# Patient Record
Sex: FEMALE | Race: WHITE | NOT HISPANIC OR LATINO | Employment: OTHER | URBAN - NONMETROPOLITAN AREA
[De-identification: names, ages, dates, MRNs, and addresses within clinical notes are randomized per-mention and may not be internally consistent; named-entity substitution may affect disease eponyms.]

---

## 2017-01-01 ENCOUNTER — HOSPITAL ENCOUNTER (INPATIENT)
Facility: HOSPITAL | Age: 67
LOS: 4 days | Discharge: HOME | End: 2017-07-06
Attending: INTERNAL MEDICINE | Admitting: FAMILY MEDICINE

## 2017-01-01 ENCOUNTER — APPOINTMENT (OUTPATIENT)
Dept: GENERAL RADIOLOGY | Facility: HOSPITAL | Age: 67
End: 2017-01-01

## 2017-01-01 VITALS
BODY MASS INDEX: 14.96 KG/M2 | HEIGHT: 62 IN | DIASTOLIC BLOOD PRESSURE: 48 MMHG | RESPIRATION RATE: 18 BRPM | TEMPERATURE: 97.1 F | OXYGEN SATURATION: 99 % | HEART RATE: 46 BPM | WEIGHT: 81.3 LBS | SYSTOLIC BLOOD PRESSURE: 133 MMHG

## 2017-01-01 LAB
ALBUMIN SERPL-MCNC: 3.9 G/DL (ref 3.5–5)
ALBUMIN/GLOB SERPL: 1.5 G/DL (ref 1.1–2.5)
ALP SERPL-CCNC: 81 U/L (ref 24–120)
ALT SERPL W P-5'-P-CCNC: 82 U/L (ref 0–54)
ANION GAP SERPL CALCULATED.3IONS-SCNC: 10 MMOL/L (ref 4–13)
ANION GAP SERPL CALCULATED.3IONS-SCNC: 4 MMOL/L (ref 4–13)
ANION GAP SERPL CALCULATED.3IONS-SCNC: 5 MMOL/L (ref 4–13)
APTT PPP: 25.2 SECONDS (ref 24.1–34.8)
ARTERIAL PATENCY WRIST A: ABNORMAL
AST SERPL-CCNC: 98 U/L (ref 7–45)
ATMOSPHERIC PRESS: ABNORMAL MMHG
BACTERIA UR QL AUTO: ABNORMAL /HPF
BASE EXCESS BLDA CALC-SCNC: -3.5 MMOL/L (ref -2–2)
BASE EXCESS BLDA CALC-SCNC: -5.1 MMOL/L (ref -2–2)
BASE EXCESS BLDA CALC-SCNC: 0.1 MMOL/L (ref -2–2)
BASE EXCESS BLDA CALC-SCNC: 4 MMOL/L (ref -2–2)
BASE EXCESS BLDA CALC-SCNC: 5.8 MMOL/L (ref -2–2)
BASOPHILS # BLD AUTO: 0.01 10*3/MM3 (ref 0–0.2)
BASOPHILS NFR BLD AUTO: 0.1 % (ref 0–2)
BDY SITE: ABNORMAL
BILIRUB SERPL-MCNC: 0.6 MG/DL (ref 0.1–1)
BILIRUB UR QL STRIP: NEGATIVE
BUN BLD-MCNC: 12 MG/DL (ref 5–21)
BUN BLD-MCNC: 15 MG/DL (ref 5–21)
BUN BLD-MCNC: 19 MG/DL (ref 5–21)
BUN/CREAT SERPL: 19 (ref 7–25)
BUN/CREAT SERPL: 27.3 (ref 7–25)
BUN/CREAT SERPL: 39.6 (ref 7–25)
CALCIUM SPEC-SCNC: 9.1 MG/DL (ref 8.4–10.4)
CALCIUM SPEC-SCNC: 9.2 MG/DL (ref 8.4–10.4)
CALCIUM SPEC-SCNC: 9.5 MG/DL (ref 8.4–10.4)
CHLORIDE SERPL-SCNC: 100 MMOL/L (ref 98–110)
CHLORIDE SERPL-SCNC: 102 MMOL/L (ref 98–110)
CHLORIDE SERPL-SCNC: 103 MMOL/L (ref 98–110)
CLARITY UR: CLEAR
CO2 SERPL-SCNC: 28 MMOL/L (ref 24–31)
CO2 SERPL-SCNC: 32 MMOL/L (ref 24–31)
CO2 SERPL-SCNC: 34 MMOL/L (ref 24–31)
COHGB MFR BLD: 0.9 % (ref 0–5.1)
COLOR UR: YELLOW
CREAT BLD-MCNC: 0.48 MG/DL (ref 0.5–1.4)
CREAT BLD-MCNC: 0.55 MG/DL (ref 0.5–1.4)
CREAT BLD-MCNC: 0.63 MG/DL (ref 0.5–1.4)
DEPRECATED RDW RBC AUTO: 42.4 FL (ref 40–54)
DEPRECATED RDW RBC AUTO: 42.5 FL (ref 40–54)
DEPRECATED RDW RBC AUTO: 43.4 FL (ref 40–54)
EOSINOPHIL # BLD AUTO: 0.05 10*3/MM3 (ref 0–0.7)
EOSINOPHIL NFR BLD AUTO: 0.5 % (ref 0–4)
EPAP: 6
ERYTHROCYTE [DISTWIDTH] IN BLOOD BY AUTOMATED COUNT: 13.1 % (ref 12–15)
ERYTHROCYTE [DISTWIDTH] IN BLOOD BY AUTOMATED COUNT: 13.2 % (ref 12–15)
ERYTHROCYTE [DISTWIDTH] IN BLOOD BY AUTOMATED COUNT: 13.2 % (ref 12–15)
GAS FLOW AIRWAY: 2 LPM
GFR SERPL CREATININE-BSD FRML MDRD: 111 ML/MIN/1.73
GFR SERPL CREATININE-BSD FRML MDRD: 130 ML/MIN/1.73
GFR SERPL CREATININE-BSD FRML MDRD: 95 ML/MIN/1.73
GLOBULIN UR ELPH-MCNC: 2.6 GM/DL
GLUCOSE BLD-MCNC: 125 MG/DL (ref 70–100)
GLUCOSE BLD-MCNC: 128 MG/DL (ref 70–100)
GLUCOSE BLD-MCNC: 150 MG/DL (ref 70–100)
GLUCOSE UR STRIP-MCNC: NEGATIVE MG/DL
HCO3 BLDA-SCNC: 23 MMOL/L (ref 22–26)
HCO3 BLDA-SCNC: 25.9 MMOL/L (ref 22–26)
HCO3 BLDA-SCNC: 26.8 MMOL/L (ref 22–26)
HCO3 BLDA-SCNC: 31.9 MMOL/L (ref 22–26)
HCO3 BLDA-SCNC: 32.8 MMOL/L (ref 22–26)
HCT VFR BLD AUTO: 33.6 % (ref 37–47)
HCT VFR BLD AUTO: 37.4 % (ref 37–47)
HCT VFR BLD AUTO: 45.4 % (ref 37–47)
HCT VFR BLD CALC: 36 % (ref 38–51)
HGB BLD-MCNC: 10.6 G/DL (ref 12–16)
HGB BLD-MCNC: 11.7 G/DL (ref 12–16)
HGB BLD-MCNC: 13.3 G/DL (ref 12–16)
HGB BLDA-MCNC: 12.1 G/DL (ref 12–16)
HGB UR QL STRIP.AUTO: ABNORMAL
HOROWITZ INDEX BLD+IHG-RTO: 100 %
HOROWITZ INDEX BLD+IHG-RTO: 30 %
HYALINE CASTS UR QL AUTO: ABNORMAL /LPF
IMM GRANULOCYTES # BLD: 0.02 10*3/MM3 (ref 0–0.03)
IMM GRANULOCYTES NFR BLD: 0.2 % (ref 0–5)
INR PPP: 1.07 (ref 0.91–1.09)
IPAP: 12
KETONES UR QL STRIP: ABNORMAL
LEUKOCYTE ESTERASE UR QL STRIP.AUTO: NEGATIVE
LYMPHOCYTES # BLD AUTO: 0.34 10*3/MM3 (ref 0.72–4.86)
LYMPHOCYTES NFR BLD AUTO: 3.1 % (ref 15–45)
Lab: ABNORMAL
Lab: ABNORMAL
MCH RBC QN AUTO: 26.3 PG (ref 28–32)
MCH RBC QN AUTO: 27.7 PG (ref 28–32)
MCH RBC QN AUTO: 28 PG (ref 28–32)
MCHC RBC AUTO-ENTMCNC: 29.3 G/DL (ref 33–36)
MCHC RBC AUTO-ENTMCNC: 31.3 G/DL (ref 33–36)
MCHC RBC AUTO-ENTMCNC: 31.5 G/DL (ref 33–36)
MCV RBC AUTO: 88.6 FL (ref 82–98)
MCV RBC AUTO: 88.7 FL (ref 82–98)
MCV RBC AUTO: 89.7 FL (ref 82–98)
METHGB BLD QL: 0.3 % (ref 0.4–1.5)
MODALITY: ABNORMAL
MONOCYTES # BLD AUTO: 0.12 10*3/MM3 (ref 0.19–1.3)
MONOCYTES NFR BLD AUTO: 1.1 % (ref 4–12)
NEUTROPHILS # BLD AUTO: 10.57 10*3/MM3 (ref 1.87–8.4)
NEUTROPHILS NFR BLD AUTO: 95 % (ref 39–78)
NITRITE UR QL STRIP: NEGATIVE
NOTIFIED BY: ABNORMAL
NOTIFIED BY: ABNORMAL
NOTIFIED WHO: ABNORMAL
NOTIFIED WHO: ABNORMAL
NRBC BLD MANUAL-RTO: 0 /100 WBC (ref 0–0)
NT-PROBNP SERPL-MCNC: 256 PG/ML (ref 0–900)
OXYHGB MFR BLDV: 91.9 % (ref 94–97)
PCO2 BLDA: 46.1 MM HG (ref 35–45)
PCO2 BLDA: 53.4 MM HG (ref 35–45)
PCO2 BLDA: 54.6 MM HG (ref 35–45)
PCO2 BLDA: 68.3 MM HG (ref 35–45)
PCO2 BLDA: 73.5 MM HG (ref 35–45)
PEEP RESPIRATORY: 5 CM[H2O]
PH BLDA: 7.18 PH UNITS (ref 7.35–7.45)
PH BLDA: 7.24 PH UNITS (ref 7.35–7.45)
PH BLDA: 7.3 PH UNITS (ref 7.35–7.45)
PH BLDA: 7.37 PH UNITS (ref 7.35–7.45)
PH BLDA: 7.39 PH UNITS (ref 7.35–7.45)
PH UR STRIP.AUTO: 6.5 [PH] (ref 5–8)
PLATELET # BLD AUTO: 178 10*3/MM3 (ref 130–400)
PLATELET # BLD AUTO: 196 10*3/MM3 (ref 130–400)
PLATELET # BLD AUTO: 206 10*3/MM3 (ref 130–400)
PMV BLD AUTO: 10.1 FL (ref 6–12)
PMV BLD AUTO: 10.2 FL (ref 6–12)
PMV BLD AUTO: 9.9 FL (ref 6–12)
PO2 BLDA: 571.9 MM HG (ref 80–100)
PO2 BLDA: 65 MM HG (ref 80–100)
PO2 BLDA: 84.4 MM HG (ref 80–100)
PO2 BLDA: 88.8 MM HG (ref 80–100)
PO2 BLDA: 89.4 MM HG (ref 80–100)
POTASSIUM BLD-SCNC: 4.5 MMOL/L (ref 3.5–5.3)
POTASSIUM BLD-SCNC: 4.8 MMOL/L (ref 3.5–5.3)
POTASSIUM BLD-SCNC: 4.9 MMOL/L (ref 3.5–5.3)
POTASSIUM BLDA-SCNC: 4.32 MMOL/L (ref 3.5–5)
PROT SERPL-MCNC: 6.5 G/DL (ref 6.3–8.7)
PROT UR QL STRIP: ABNORMAL
PROTHROMBIN TIME: 14.2 SECONDS (ref 11.9–14.6)
RBC # BLD AUTO: 3.79 10*6/MM3 (ref 4.2–5.4)
RBC # BLD AUTO: 4.22 10*6/MM3 (ref 4.2–5.4)
RBC # BLD AUTO: 5.06 10*6/MM3 (ref 4.2–5.4)
RBC # UR: ABNORMAL /HPF
REF LAB TEST METHOD: ABNORMAL
SAO2 % BLDCOA: 94.5 % (ref 94–100)
SAO2 % BLDCOA: 94.5 % (ref 94–100)
SAO2 % BLDCOA: 95.7 % (ref 94–100)
SAO2 % BLDCOA: 95.7 % (ref 94–100)
SAO2 % BLDCOA: 96.5 % (ref 94–100)
SAO2 % BLDCOA: 96.5 % (ref 94–100)
SAO2 % BLDCOA: 99.9 % (ref 94–100)
SAO2 % BLDCOA: 99.9 % (ref 94–100)
SODIUM BLD-SCNC: 138 MMOL/L (ref 135–145)
SODIUM BLD-SCNC: 139 MMOL/L (ref 135–145)
SODIUM BLD-SCNC: 141 MMOL/L (ref 135–145)
SODIUM BLDA-SCNC: 135.8 MMOL/L (ref 135–145)
SP GR UR STRIP: 1.01 (ref 1–1.03)
SQUAMOUS #/AREA URNS HPF: ABNORMAL /HPF
TOTAL RATE: 14 BREATHS/MINUTE
TOTAL RATE: 18 BREATHS/MINUTE
TOTAL RATE: 20 BREATHS/MINUTE
TROPONIN I SERPL-MCNC: 0.04 NG/ML (ref 0–0.03)
TROPONIN I SERPL-MCNC: 0.14 NG/ML (ref 0–0.03)
UROBILINOGEN UR QL STRIP: ABNORMAL
VENT CPAP/PEEP: 5
VENTILATOR MODE: AC
WBC NRBC COR # BLD: 11.11 10*3/MM3 (ref 4.8–10.8)
WBC NRBC COR # BLD: 3.41 10*3/MM3 (ref 4.8–10.8)
WBC NRBC COR # BLD: 4.76 10*3/MM3 (ref 4.8–10.8)
WBC UR QL AUTO: ABNORMAL /HPF

## 2017-01-01 PROCEDURE — 25010000002 METHYLPREDNISOLONE PER 40 MG: Performed by: NURSE PRACTITIONER

## 2017-01-01 PROCEDURE — 63710000001 PREDNISONE PER 1 MG: Performed by: NURSE PRACTITIONER

## 2017-01-01 PROCEDURE — 80048 BASIC METABOLIC PNL TOTAL CA: CPT | Performed by: INTERNAL MEDICINE

## 2017-01-01 PROCEDURE — 94799 UNLISTED PULMONARY SVC/PX: CPT

## 2017-01-01 PROCEDURE — 94660 CPAP INITIATION&MGMT: CPT

## 2017-01-01 PROCEDURE — 25010000002 ENOXAPARIN PER 10 MG: Performed by: INTERNAL MEDICINE

## 2017-01-01 PROCEDURE — 84484 ASSAY OF TROPONIN QUANT: CPT | Performed by: INTERNAL MEDICINE

## 2017-01-01 PROCEDURE — 82803 BLOOD GASES ANY COMBINATION: CPT

## 2017-01-01 PROCEDURE — 94640 AIRWAY INHALATION TREATMENT: CPT

## 2017-01-01 PROCEDURE — 25010000002 METHYLPREDNISOLONE PER 125 MG: Performed by: INTERNAL MEDICINE

## 2017-01-01 PROCEDURE — 94760 N-INVAS EAR/PLS OXIMETRY 1: CPT

## 2017-01-01 PROCEDURE — 82375 ASSAY CARBOXYHB QUANT: CPT

## 2017-01-01 PROCEDURE — 25010000002 LEVOFLOXACIN PER 250 MG: Performed by: INTERNAL MEDICINE

## 2017-01-01 PROCEDURE — 94002 VENT MGMT INPAT INIT DAY: CPT

## 2017-01-01 PROCEDURE — 82805 BLOOD GASES W/O2 SATURATION: CPT

## 2017-01-01 PROCEDURE — 36600 WITHDRAWAL OF ARTERIAL BLOOD: CPT

## 2017-01-01 PROCEDURE — 25010000003 HYDROXYZINE PER 25 MG: Performed by: INTERNAL MEDICINE

## 2017-01-01 PROCEDURE — 85027 COMPLETE CBC AUTOMATED: CPT | Performed by: INTERNAL MEDICINE

## 2017-01-01 PROCEDURE — 85610 PROTHROMBIN TIME: CPT | Performed by: INTERNAL MEDICINE

## 2017-01-01 PROCEDURE — 85730 THROMBOPLASTIN TIME PARTIAL: CPT | Performed by: INTERNAL MEDICINE

## 2017-01-01 PROCEDURE — 94770: CPT

## 2017-01-01 PROCEDURE — 5A09457 ASSISTANCE WITH RESPIRATORY VENTILATION, 24-96 CONSECUTIVE HOURS, CONTINUOUS POSITIVE AIRWAY PRESSURE: ICD-10-PCS | Performed by: INTERNAL MEDICINE

## 2017-01-01 PROCEDURE — 94003 VENT MGMT INPAT SUBQ DAY: CPT

## 2017-01-01 PROCEDURE — 25010000002 METHYLPREDNISOLONE PER 40 MG: Performed by: INTERNAL MEDICINE

## 2017-01-01 PROCEDURE — 71010 HC CHEST PA OR AP: CPT

## 2017-01-01 PROCEDURE — 81001 URINALYSIS AUTO W/SCOPE: CPT | Performed by: INTERNAL MEDICINE

## 2017-01-01 PROCEDURE — 83050 HGB METHEMOGLOBIN QUAN: CPT

## 2017-01-01 PROCEDURE — 85025 COMPLETE CBC W/AUTO DIFF WBC: CPT | Performed by: INTERNAL MEDICINE

## 2017-01-01 PROCEDURE — 80053 COMPREHEN METABOLIC PANEL: CPT | Performed by: INTERNAL MEDICINE

## 2017-01-01 PROCEDURE — 83880 ASSAY OF NATRIURETIC PEPTIDE: CPT | Performed by: INTERNAL MEDICINE

## 2017-01-01 PROCEDURE — 0BH17EZ INSERTION OF ENDOTRACHEAL AIRWAY INTO TRACHEA, VIA NATURAL OR ARTIFICIAL OPENING: ICD-10-PCS | Performed by: INTERNAL MEDICINE

## 2017-01-01 PROCEDURE — 5A1945Z RESPIRATORY VENTILATION, 24-96 CONSECUTIVE HOURS: ICD-10-PCS | Performed by: INTERNAL MEDICINE

## 2017-01-01 RX ORDER — DOXYCYCLINE HYCLATE 100 MG
100 TABLET ORAL EVERY 12 HOURS SCHEDULED
Status: DISCONTINUED | OUTPATIENT
Start: 2017-01-01 | End: 2017-01-01 | Stop reason: HOSPADM

## 2017-01-01 RX ORDER — METHYLPREDNISOLONE SODIUM SUCCINATE 40 MG/ML
40 INJECTION, POWDER, LYOPHILIZED, FOR SOLUTION INTRAMUSCULAR; INTRAVENOUS EVERY 8 HOURS
Status: DISCONTINUED | OUTPATIENT
Start: 2017-01-01 | End: 2017-01-01

## 2017-01-01 RX ORDER — BUDESONIDE AND FORMOTEROL FUMARATE DIHYDRATE 160; 4.5 UG/1; UG/1
2 AEROSOL RESPIRATORY (INHALATION)
Status: DISCONTINUED | OUTPATIENT
Start: 2017-01-01 | End: 2017-01-01 | Stop reason: HOSPADM

## 2017-01-01 RX ORDER — HYDROXYZINE PAMOATE 25 MG/1
25 CAPSULE ORAL EVERY 6 HOURS PRN
Status: DISCONTINUED | OUTPATIENT
Start: 2017-01-01 | End: 2017-01-01

## 2017-01-01 RX ORDER — MINERAL OIL AND WHITE PETROLATUM 150; 830 MG/G; MG/G
OINTMENT OPHTHALMIC EVERY 4 HOURS PRN
Status: DISCONTINUED | OUTPATIENT
Start: 2017-01-01 | End: 2017-01-01 | Stop reason: HOSPADM

## 2017-01-01 RX ORDER — DOXYCYCLINE 50 MG/1
100 CAPSULE ORAL EVERY 12 HOURS SCHEDULED
Status: DISCONTINUED | OUTPATIENT
Start: 2017-01-01 | End: 2017-01-01 | Stop reason: SDUPTHER

## 2017-01-01 RX ORDER — ATORVASTATIN CALCIUM 10 MG/1
10 TABLET, FILM COATED ORAL DAILY
COMMUNITY

## 2017-01-01 RX ORDER — LEVOTHYROXINE SODIUM 0.15 MG/1
150 TABLET ORAL DAILY
COMMUNITY

## 2017-01-01 RX ORDER — HYDROXYZINE PAMOATE 50 MG/1
50 CAPSULE ORAL EVERY 6 HOURS PRN
Status: DISCONTINUED | OUTPATIENT
Start: 2017-01-01 | End: 2017-01-01 | Stop reason: HOSPADM

## 2017-01-01 RX ORDER — FAMOTIDINE 10 MG/ML
20 INJECTION, SOLUTION INTRAVENOUS 2 TIMES DAILY
Status: DISCONTINUED | OUTPATIENT
Start: 2017-01-01 | End: 2017-01-01 | Stop reason: CLARIF

## 2017-01-01 RX ORDER — PANTOPRAZOLE SODIUM 40 MG/10ML
40 INJECTION, POWDER, LYOPHILIZED, FOR SOLUTION INTRAVENOUS
Status: DISCONTINUED | OUTPATIENT
Start: 2017-01-01 | End: 2017-01-01 | Stop reason: CLARIF

## 2017-01-01 RX ORDER — DIAZEPAM 5 MG/1
5 TABLET ORAL EVERY 8 HOURS SCHEDULED
Status: DISCONTINUED | OUTPATIENT
Start: 2017-01-01 | End: 2017-01-01 | Stop reason: HOSPADM

## 2017-01-01 RX ORDER — GUAIFENESIN 600 MG/1
1200 TABLET, EXTENDED RELEASE ORAL EVERY 12 HOURS SCHEDULED
Status: DISCONTINUED | OUTPATIENT
Start: 2017-01-01 | End: 2017-01-01 | Stop reason: HOSPADM

## 2017-01-01 RX ORDER — RIZATRIPTAN BENZOATE 10 MG/1
10 TABLET ORAL DAILY PRN
COMMUNITY

## 2017-01-01 RX ORDER — DIAZEPAM 10 MG/1
10 TABLET ORAL
COMMUNITY

## 2017-01-01 RX ORDER — ACETAMINOPHEN 325 MG/1
325 TABLET ORAL EVERY 4 HOURS PRN
Status: DISCONTINUED | OUTPATIENT
Start: 2017-01-01 | End: 2017-01-01 | Stop reason: HOSPADM

## 2017-01-01 RX ORDER — IPRATROPIUM BROMIDE AND ALBUTEROL SULFATE 2.5; .5 MG/3ML; MG/3ML
3 SOLUTION RESPIRATORY (INHALATION)
COMMUNITY

## 2017-01-01 RX ORDER — IPRATROPIUM BROMIDE AND ALBUTEROL SULFATE 2.5; .5 MG/3ML; MG/3ML
3 SOLUTION RESPIRATORY (INHALATION)
Status: DISCONTINUED | OUTPATIENT
Start: 2017-01-01 | End: 2017-01-01

## 2017-01-01 RX ORDER — METHYLPREDNISOLONE SODIUM SUCCINATE 125 MG/2ML
80 INJECTION, POWDER, LYOPHILIZED, FOR SOLUTION INTRAMUSCULAR; INTRAVENOUS EVERY 8 HOURS
Status: DISCONTINUED | OUTPATIENT
Start: 2017-01-01 | End: 2017-01-01

## 2017-01-01 RX ORDER — LEVOTHYROXINE SODIUM ANHYDROUS 100 UG/5ML
75 INJECTION, POWDER, LYOPHILIZED, FOR SOLUTION INTRAVENOUS
Status: DISCONTINUED | OUTPATIENT
Start: 2017-01-01 | End: 2017-01-01

## 2017-01-01 RX ORDER — ONDANSETRON 2 MG/ML
4 INJECTION INTRAMUSCULAR; INTRAVENOUS EVERY 6 HOURS PRN
Status: DISCONTINUED | OUTPATIENT
Start: 2017-01-01 | End: 2017-01-01 | Stop reason: HOSPADM

## 2017-01-01 RX ORDER — ACETAMINOPHEN 325 MG/1
325 TABLET ORAL EVERY 4 HOURS PRN
COMMUNITY

## 2017-01-01 RX ORDER — NICOTINE 21 MG/24HR
1 PATCH, TRANSDERMAL 24 HOURS TRANSDERMAL EVERY 24 HOURS
Status: DISCONTINUED | OUTPATIENT
Start: 2017-01-01 | End: 2017-01-01 | Stop reason: HOSPADM

## 2017-01-01 RX ORDER — DIAZEPAM 10 MG/1
10 TABLET ORAL EVERY 8 HOURS SCHEDULED
Status: DISCONTINUED | OUTPATIENT
Start: 2017-01-01 | End: 2017-01-01

## 2017-01-01 RX ORDER — BUDESONIDE AND FORMOTEROL FUMARATE DIHYDRATE 160; 4.5 UG/1; UG/1
2 AEROSOL RESPIRATORY (INHALATION)
Qty: 1 INHALER | Refills: 12 | Status: SHIPPED | OUTPATIENT
Start: 2017-01-01

## 2017-01-01 RX ORDER — DOXYCYCLINE HYCLATE 100 MG
100 TABLET ORAL EVERY 12 HOURS SCHEDULED
Qty: 4 TABLET | Refills: 0 | Status: SHIPPED | OUTPATIENT
Start: 2017-01-01

## 2017-01-01 RX ORDER — SOTALOL HYDROCHLORIDE 80 MG/1
80 TABLET ORAL EVERY 12 HOURS
COMMUNITY

## 2017-01-01 RX ORDER — FAMOTIDINE 20 MG/1
20 TABLET, FILM COATED ORAL 2 TIMES DAILY
Status: DISCONTINUED | OUTPATIENT
Start: 2017-01-01 | End: 2017-01-01 | Stop reason: HOSPADM

## 2017-01-01 RX ORDER — RIZATRIPTAN BENZOATE 5 MG/1
5 TABLET, ORALLY DISINTEGRATING ORAL ONCE AS NEEDED
Status: ON HOLD | COMMUNITY
End: 2017-01-01

## 2017-01-01 RX ORDER — IPRATROPIUM BROMIDE AND ALBUTEROL SULFATE 2.5; .5 MG/3ML; MG/3ML
3 SOLUTION RESPIRATORY (INHALATION) EVERY 4 HOURS PRN
Status: DISCONTINUED | OUTPATIENT
Start: 2017-01-01 | End: 2017-01-01

## 2017-01-01 RX ORDER — ONDANSETRON 4 MG/1
4 TABLET, FILM COATED ORAL EVERY 8 HOURS PRN
COMMUNITY

## 2017-01-01 RX ORDER — LEVOTHYROXINE SODIUM 0.15 MG/1
150 TABLET ORAL
Status: DISCONTINUED | OUTPATIENT
Start: 2017-01-01 | End: 2017-01-01 | Stop reason: HOSPADM

## 2017-01-01 RX ORDER — SOTALOL HYDROCHLORIDE 80 MG/1
80 TABLET ORAL 2 TIMES DAILY
Status: DISCONTINUED | OUTPATIENT
Start: 2017-01-01 | End: 2017-01-01 | Stop reason: HOSPADM

## 2017-01-01 RX ORDER — LEVOFLOXACIN 5 MG/ML
500 INJECTION, SOLUTION INTRAVENOUS EVERY 24 HOURS
Status: DISCONTINUED | OUTPATIENT
Start: 2017-01-01 | End: 2017-01-01

## 2017-01-01 RX ORDER — PREDNISONE 20 MG/1
20 TABLET ORAL
Qty: 7 TABLET | Refills: 0 | Status: SHIPPED | OUTPATIENT
Start: 2017-01-01

## 2017-01-01 RX ORDER — SODIUM CHLORIDE 0.9 % (FLUSH) 0.9 %
1-10 SYRINGE (ML) INJECTION AS NEEDED
Status: DISCONTINUED | OUTPATIENT
Start: 2017-01-01 | End: 2017-01-01 | Stop reason: HOSPADM

## 2017-01-01 RX ORDER — IPRATROPIUM BROMIDE AND ALBUTEROL SULFATE 2.5; .5 MG/3ML; MG/3ML
3 SOLUTION RESPIRATORY (INHALATION)
Status: DISCONTINUED | OUTPATIENT
Start: 2017-01-01 | End: 2017-01-01 | Stop reason: HOSPADM

## 2017-01-01 RX ORDER — HYDROCODONE BITARTRATE AND ACETAMINOPHEN 10; 325 MG/1; MG/1
1 TABLET ORAL 4 TIMES DAILY
COMMUNITY

## 2017-01-01 RX ORDER — PROMETHAZINE HYDROCHLORIDE 25 MG/1
25 TABLET ORAL EVERY 6 HOURS PRN
COMMUNITY

## 2017-01-01 RX ORDER — METHYLPREDNISOLONE SODIUM SUCCINATE 40 MG/ML
40 INJECTION, POWDER, LYOPHILIZED, FOR SOLUTION INTRAMUSCULAR; INTRAVENOUS EVERY 6 HOURS
Status: DISCONTINUED | OUTPATIENT
Start: 2017-01-01 | End: 2017-01-01

## 2017-01-01 RX ORDER — HYDROCODONE BITARTRATE AND ACETAMINOPHEN 10; 325 MG/1; MG/1
1 TABLET ORAL EVERY 6 HOURS PRN
Status: DISCONTINUED | OUTPATIENT
Start: 2017-01-01 | End: 2017-01-01 | Stop reason: HOSPADM

## 2017-01-01 RX ORDER — PREDNISONE 20 MG/1
20 TABLET ORAL
Status: DISCONTINUED | OUTPATIENT
Start: 2017-01-01 | End: 2017-01-01 | Stop reason: HOSPADM

## 2017-01-01 RX ORDER — HYDROXYZINE HYDROCHLORIDE 50 MG/ML
50 INJECTION, SOLUTION INTRAMUSCULAR ONCE
Status: COMPLETED | OUTPATIENT
Start: 2017-01-01 | End: 2017-01-01

## 2017-01-01 RX ORDER — METHYLPREDNISOLONE SODIUM SUCCINATE 40 MG/ML
40 INJECTION, POWDER, LYOPHILIZED, FOR SOLUTION INTRAMUSCULAR; INTRAVENOUS EVERY 12 HOURS
Status: DISCONTINUED | OUTPATIENT
Start: 2017-01-01 | End: 2017-01-01

## 2017-01-01 RX ADMIN — METHYLPREDNISOLONE SODIUM SUCCINATE 40 MG: 40 INJECTION, POWDER, FOR SOLUTION INTRAMUSCULAR; INTRAVENOUS at 21:10

## 2017-01-01 RX ADMIN — DIAZEPAM 5 MG: 5 TABLET ORAL at 05:50

## 2017-01-01 RX ADMIN — IPRATROPIUM BROMIDE AND ALBUTEROL SULFATE 3 ML: .5; 3 SOLUTION RESPIRATORY (INHALATION) at 14:58

## 2017-01-01 RX ADMIN — DIAZEPAM 10 MG: 10 TABLET ORAL at 10:47

## 2017-01-01 RX ADMIN — IPRATROPIUM BROMIDE AND ALBUTEROL SULFATE 3 ML: .5; 3 SOLUTION RESPIRATORY (INHALATION) at 02:52

## 2017-01-01 RX ADMIN — IPRATROPIUM BROMIDE AND ALBUTEROL SULFATE 3 ML: .5; 3 SOLUTION RESPIRATORY (INHALATION) at 15:35

## 2017-01-01 RX ADMIN — BUDESONIDE AND FORMOTEROL FUMARATE DIHYDRATE 2 PUFF: 160; 4.5 AEROSOL RESPIRATORY (INHALATION) at 19:30

## 2017-01-01 RX ADMIN — GUAIFENESIN 1200 MG: 600 TABLET, EXTENDED RELEASE ORAL at 09:21

## 2017-01-01 RX ADMIN — BUDESONIDE AND FORMOTEROL FUMARATE DIHYDRATE 2 PUFF: 160; 4.5 AEROSOL RESPIRATORY (INHALATION) at 18:56

## 2017-01-01 RX ADMIN — APIXABAN 5 MG: 5 TABLET, FILM COATED ORAL at 09:05

## 2017-01-01 RX ADMIN — DOXYCYCLINE 100 MG: 100 TABLET, FILM COATED ORAL at 21:16

## 2017-01-01 RX ADMIN — DIAZEPAM 5 MG: 5 TABLET ORAL at 14:26

## 2017-01-01 RX ADMIN — IPRATROPIUM BROMIDE AND ALBUTEROL SULFATE 3 ML: .5; 3 SOLUTION RESPIRATORY (INHALATION) at 11:54

## 2017-01-01 RX ADMIN — IPRATROPIUM BROMIDE AND ALBUTEROL SULFATE 3 ML: .5; 3 SOLUTION RESPIRATORY (INHALATION) at 10:46

## 2017-01-01 RX ADMIN — DOXYCYCLINE 100 MG: 100 TABLET, FILM COATED ORAL at 10:47

## 2017-01-01 RX ADMIN — IPRATROPIUM BROMIDE AND ALBUTEROL SULFATE 3 ML: .5; 3 SOLUTION RESPIRATORY (INHALATION) at 19:30

## 2017-01-01 RX ADMIN — SOTALOL HYDROCHLORIDE 80 MG: 80 TABLET ORAL at 10:47

## 2017-01-01 RX ADMIN — DIAZEPAM 5 MG: 5 TABLET ORAL at 06:40

## 2017-01-01 RX ADMIN — DIAZEPAM 10 MG: 10 TABLET ORAL at 21:16

## 2017-01-01 RX ADMIN — IPRATROPIUM BROMIDE AND ALBUTEROL SULFATE 3 ML: .5; 3 SOLUTION RESPIRATORY (INHALATION) at 14:39

## 2017-01-01 RX ADMIN — BUDESONIDE AND FORMOTEROL FUMARATE DIHYDRATE 2 PUFF: 160; 4.5 AEROSOL RESPIRATORY (INHALATION) at 19:07

## 2017-01-01 RX ADMIN — METHYLPREDNISOLONE SODIUM SUCCINATE 40 MG: 40 INJECTION, POWDER, FOR SOLUTION INTRAMUSCULAR; INTRAVENOUS at 16:55

## 2017-01-01 RX ADMIN — METHYLPREDNISOLONE SODIUM SUCCINATE 40 MG: 40 INJECTION, POWDER, FOR SOLUTION INTRAMUSCULAR; INTRAVENOUS at 03:47

## 2017-01-01 RX ADMIN — DOXYCYCLINE 100 MG: 100 TABLET, FILM COATED ORAL at 09:04

## 2017-01-01 RX ADMIN — IPRATROPIUM BROMIDE AND ALBUTEROL SULFATE 3 ML: .5; 3 SOLUTION RESPIRATORY (INHALATION) at 03:14

## 2017-01-01 RX ADMIN — IPRATROPIUM BROMIDE AND ALBUTEROL SULFATE 3 ML: .5; 3 SOLUTION RESPIRATORY (INHALATION) at 18:56

## 2017-01-01 RX ADMIN — IPRATROPIUM BROMIDE AND ALBUTEROL SULFATE 3 ML: .5; 3 SOLUTION RESPIRATORY (INHALATION) at 07:34

## 2017-01-01 RX ADMIN — IPRATROPIUM BROMIDE AND ALBUTEROL SULFATE 3 ML: .5; 3 SOLUTION RESPIRATORY (INHALATION) at 11:21

## 2017-01-01 RX ADMIN — SOTALOL HYDROCHLORIDE 80 MG: 80 TABLET ORAL at 09:21

## 2017-01-01 RX ADMIN — BUDESONIDE AND FORMOTEROL FUMARATE DIHYDRATE 2 PUFF: 160; 4.5 AEROSOL RESPIRATORY (INHALATION) at 07:34

## 2017-01-01 RX ADMIN — LEVOTHYROXINE SODIUM 150 MCG: 150 TABLET ORAL at 05:50

## 2017-01-01 RX ADMIN — NICOTINE 1 PATCH: 14 PATCH, EXTENDED RELEASE TRANSDERMAL at 09:04

## 2017-01-01 RX ADMIN — FAMOTIDINE 20 MG: 20 TABLET, FILM COATED ORAL at 09:21

## 2017-01-01 RX ADMIN — IPRATROPIUM BROMIDE AND ALBUTEROL SULFATE 3 ML: .5; 3 SOLUTION RESPIRATORY (INHALATION) at 11:08

## 2017-01-01 RX ADMIN — FAMOTIDINE 20 MG: 20 TABLET, FILM COATED ORAL at 17:53

## 2017-01-01 RX ADMIN — APIXABAN 5 MG: 5 TABLET, FILM COATED ORAL at 09:21

## 2017-01-01 RX ADMIN — METHYLPREDNISOLONE SODIUM SUCCINATE 40 MG: 40 INJECTION, POWDER, FOR SOLUTION INTRAMUSCULAR; INTRAVENOUS at 17:53

## 2017-01-01 RX ADMIN — DIAZEPAM 5 MG: 5 TABLET ORAL at 21:54

## 2017-01-01 RX ADMIN — GUAIFENESIN 1200 MG: 600 TABLET, EXTENDED RELEASE ORAL at 21:16

## 2017-01-01 RX ADMIN — HYDROXYZINE HYDROCHLORIDE 50 MG: 50 INJECTION, SOLUTION INTRAMUSCULAR at 08:07

## 2017-01-01 RX ADMIN — GUAIFENESIN 1200 MG: 600 TABLET, EXTENDED RELEASE ORAL at 09:10

## 2017-01-01 RX ADMIN — DIAZEPAM 10 MG: 10 TABLET ORAL at 06:10

## 2017-01-01 RX ADMIN — IPRATROPIUM BROMIDE AND ALBUTEROL SULFATE 3 ML: .5; 3 SOLUTION RESPIRATORY (INHALATION) at 04:18

## 2017-01-01 RX ADMIN — DOXYCYCLINE 100 MG: 100 TABLET, FILM COATED ORAL at 08:37

## 2017-01-01 RX ADMIN — LEVOTHYROXINE SODIUM 150 MCG: 150 TABLET ORAL at 10:47

## 2017-01-01 RX ADMIN — MINERAL OIL AND WHITE PETROLATUM: 150; 830 OINTMENT OPHTHALMIC at 11:47

## 2017-01-01 RX ADMIN — IPRATROPIUM BROMIDE AND ALBUTEROL SULFATE 3 ML: .5; 3 SOLUTION RESPIRATORY (INHALATION) at 06:59

## 2017-01-01 RX ADMIN — SOTALOL HYDROCHLORIDE 80 MG: 80 TABLET ORAL at 09:04

## 2017-01-01 RX ADMIN — IPRATROPIUM BROMIDE AND ALBUTEROL SULFATE 3 ML: .5; 3 SOLUTION RESPIRATORY (INHALATION) at 19:19

## 2017-01-01 RX ADMIN — GUAIFENESIN 1200 MG: 600 TABLET, EXTENDED RELEASE ORAL at 22:19

## 2017-01-01 RX ADMIN — LEVOFLOXACIN 500 MG: 500 INJECTION, SOLUTION INTRAVENOUS at 04:03

## 2017-01-01 RX ADMIN — SOTALOL HYDROCHLORIDE 80 MG: 80 TABLET ORAL at 22:19

## 2017-01-01 RX ADMIN — APIXABAN 5 MG: 5 TABLET, FILM COATED ORAL at 21:54

## 2017-01-01 RX ADMIN — HYDROXYZINE PAMOATE 50 MG: 50 CAPSULE ORAL at 22:55

## 2017-01-01 RX ADMIN — SOTALOL HYDROCHLORIDE 80 MG: 80 TABLET ORAL at 21:54

## 2017-01-01 RX ADMIN — GUAIFENESIN 1200 MG: 600 TABLET, EXTENDED RELEASE ORAL at 21:55

## 2017-01-01 RX ADMIN — NICOTINE 1 PATCH: 14 PATCH, EXTENDED RELEASE TRANSDERMAL at 09:11

## 2017-01-01 RX ADMIN — FAMOTIDINE 20 MG: 20 TABLET, FILM COATED ORAL at 18:07

## 2017-01-01 RX ADMIN — METHYLPREDNISOLONE SODIUM SUCCINATE 80 MG: 125 INJECTION, POWDER, FOR SOLUTION INTRAMUSCULAR; INTRAVENOUS at 04:04

## 2017-01-01 RX ADMIN — METHYLPREDNISOLONE SODIUM SUCCINATE 40 MG: 40 INJECTION, POWDER, FOR SOLUTION INTRAMUSCULAR; INTRAVENOUS at 23:10

## 2017-01-01 RX ADMIN — FAMOTIDINE 20 MG: 20 TABLET, FILM COATED ORAL at 08:37

## 2017-01-01 RX ADMIN — LEVOTHYROXINE SODIUM 150 MCG: 150 TABLET ORAL at 06:10

## 2017-01-01 RX ADMIN — METHYLPREDNISOLONE SODIUM SUCCINATE 40 MG: 40 INJECTION, POWDER, FOR SOLUTION INTRAMUSCULAR; INTRAVENOUS at 09:12

## 2017-01-01 RX ADMIN — DOXYCYCLINE 100 MG: 100 TABLET, FILM COATED ORAL at 09:21

## 2017-01-01 RX ADMIN — IPRATROPIUM BROMIDE AND ALBUTEROL SULFATE 3 ML: .5; 3 SOLUTION RESPIRATORY (INHALATION) at 07:56

## 2017-01-01 RX ADMIN — APIXABAN 5 MG: 5 TABLET, FILM COATED ORAL at 09:10

## 2017-01-01 RX ADMIN — IPRATROPIUM BROMIDE AND ALBUTEROL SULFATE 3 ML: .5; 3 SOLUTION RESPIRATORY (INHALATION) at 23:18

## 2017-01-01 RX ADMIN — METHYLPREDNISOLONE SODIUM SUCCINATE 40 MG: 40 INJECTION, POWDER, FOR SOLUTION INTRAMUSCULAR; INTRAVENOUS at 10:54

## 2017-01-01 RX ADMIN — PREDNISONE 20 MG: 20 TABLET ORAL at 09:50

## 2017-01-01 RX ADMIN — IPRATROPIUM BROMIDE AND ALBUTEROL SULFATE 3 ML: .5; 3 SOLUTION RESPIRATORY (INHALATION) at 06:52

## 2017-01-01 RX ADMIN — APIXABAN 5 MG: 5 TABLET, FILM COATED ORAL at 08:37

## 2017-01-01 RX ADMIN — DIAZEPAM 5 MG: 5 TABLET ORAL at 21:07

## 2017-01-01 RX ADMIN — FAMOTIDINE 20 MG: 20 TABLET, FILM COATED ORAL at 17:46

## 2017-01-01 RX ADMIN — NICOTINE 1 PATCH: 14 PATCH, EXTENDED RELEASE TRANSDERMAL at 09:55

## 2017-01-01 RX ADMIN — IPRATROPIUM BROMIDE AND ALBUTEROL SULFATE 3 ML: .5; 3 SOLUTION RESPIRATORY (INHALATION) at 07:38

## 2017-01-01 RX ADMIN — METHYLPREDNISOLONE SODIUM SUCCINATE 40 MG: 40 INJECTION, POWDER, FOR SOLUTION INTRAMUSCULAR; INTRAVENOUS at 03:18

## 2017-01-01 RX ADMIN — APIXABAN 5 MG: 5 TABLET, FILM COATED ORAL at 21:08

## 2017-01-01 RX ADMIN — DIAZEPAM 5 MG: 5 TABLET ORAL at 22:19

## 2017-01-01 RX ADMIN — FAMOTIDINE 20 MG: 20 TABLET, FILM COATED ORAL at 17:31

## 2017-01-01 RX ADMIN — SOTALOL HYDROCHLORIDE 80 MG: 80 TABLET ORAL at 08:37

## 2017-01-01 RX ADMIN — APIXABAN 5 MG: 5 TABLET, FILM COATED ORAL at 22:19

## 2017-01-01 RX ADMIN — DOXYCYCLINE 100 MG: 100 TABLET, FILM COATED ORAL at 22:19

## 2017-01-01 RX ADMIN — DOXYCYCLINE 100 MG: 100 TABLET, FILM COATED ORAL at 21:08

## 2017-01-01 RX ADMIN — DOXYCYCLINE 100 MG: 100 TABLET, FILM COATED ORAL at 09:10

## 2017-01-01 RX ADMIN — SOTALOL HYDROCHLORIDE 80 MG: 80 TABLET ORAL at 09:10

## 2017-01-01 RX ADMIN — NICOTINE 1 PATCH: 14 PATCH, EXTENDED RELEASE TRANSDERMAL at 10:45

## 2017-01-01 RX ADMIN — IPRATROPIUM BROMIDE AND ALBUTEROL SULFATE 3 ML: .5; 3 SOLUTION RESPIRATORY (INHALATION) at 23:17

## 2017-01-01 RX ADMIN — FAMOTIDINE 20 MG: 10 INJECTION INTRAVENOUS at 08:54

## 2017-01-01 RX ADMIN — DIAZEPAM 5 MG: 5 TABLET ORAL at 13:47

## 2017-01-01 RX ADMIN — LEVOTHYROXINE SODIUM 150 MCG: 150 TABLET ORAL at 06:44

## 2017-01-01 RX ADMIN — METHYLPREDNISOLONE SODIUM SUCCINATE 40 MG: 40 INJECTION, POWDER, FOR SOLUTION INTRAMUSCULAR; INTRAVENOUS at 12:39

## 2017-01-01 RX ADMIN — GUAIFENESIN 1200 MG: 600 TABLET, EXTENDED RELEASE ORAL at 08:37

## 2017-01-01 RX ADMIN — DIAZEPAM 5 MG: 5 TABLET ORAL at 06:44

## 2017-01-01 RX ADMIN — IPRATROPIUM BROMIDE AND ALBUTEROL SULFATE 3 ML: .5; 3 SOLUTION RESPIRATORY (INHALATION) at 19:07

## 2017-01-01 RX ADMIN — ENOXAPARIN SODIUM 40 MG: 40 INJECTION SUBCUTANEOUS at 08:54

## 2017-01-01 RX ADMIN — DOXYCYCLINE 100 MG: 100 TABLET, FILM COATED ORAL at 21:55

## 2017-01-01 RX ADMIN — DIAZEPAM 5 MG: 5 TABLET ORAL at 13:34

## 2017-01-01 RX ADMIN — IPRATROPIUM BROMIDE AND ALBUTEROL SULFATE 3 ML: .5; 3 SOLUTION RESPIRATORY (INHALATION) at 23:59

## 2017-01-01 RX ADMIN — GUAIFENESIN 1200 MG: 600 TABLET, EXTENDED RELEASE ORAL at 09:05

## 2017-01-01 RX ADMIN — SOTALOL HYDROCHLORIDE 80 MG: 80 TABLET ORAL at 21:08

## 2017-01-01 RX ADMIN — NICOTINE 1 PATCH: 14 PATCH, EXTENDED RELEASE TRANSDERMAL at 09:23

## 2017-01-01 RX ADMIN — ACETAMINOPHEN 325 MG: 325 TABLET, FILM COATED ORAL at 17:01

## 2017-01-01 RX ADMIN — METHYLPREDNISOLONE SODIUM SUCCINATE 40 MG: 40 INJECTION, POWDER, FOR SOLUTION INTRAMUSCULAR; INTRAVENOUS at 10:47

## 2017-01-01 RX ADMIN — MINERAL OIL AND WHITE PETROLATUM 1 APPLICATION: 150; 830 OINTMENT OPHTHALMIC at 06:10

## 2017-01-01 RX ADMIN — IPRATROPIUM BROMIDE AND ALBUTEROL SULFATE 3 ML: .5; 3 SOLUTION RESPIRATORY (INHALATION) at 03:52

## 2017-01-01 RX ADMIN — METHYLPREDNISOLONE SODIUM SUCCINATE 40 MG: 40 INJECTION, POWDER, FOR SOLUTION INTRAMUSCULAR; INTRAVENOUS at 22:55

## 2017-01-01 RX ADMIN — APIXABAN 5 MG: 5 TABLET, FILM COATED ORAL at 17:46

## 2017-01-01 RX ADMIN — BUDESONIDE AND FORMOTEROL FUMARATE DIHYDRATE 2 PUFF: 160; 4.5 AEROSOL RESPIRATORY (INHALATION) at 07:55

## 2017-01-01 RX ADMIN — SOTALOL HYDROCHLORIDE 80 MG: 80 TABLET ORAL at 21:16

## 2017-01-01 RX ADMIN — GUAIFENESIN 1200 MG: 600 TABLET, EXTENDED RELEASE ORAL at 21:08

## 2017-01-01 RX ADMIN — METHYLPREDNISOLONE SODIUM SUCCINATE 40 MG: 40 INJECTION, POWDER, FOR SOLUTION INTRAMUSCULAR; INTRAVENOUS at 23:21

## 2017-01-01 RX ADMIN — IPRATROPIUM BROMIDE AND ALBUTEROL SULFATE 3 ML: .5; 3 SOLUTION RESPIRATORY (INHALATION) at 15:54

## 2017-01-01 RX ADMIN — LEVOTHYROXINE SODIUM 150 MCG: 150 TABLET ORAL at 06:40

## 2017-01-01 RX ADMIN — IPRATROPIUM BROMIDE AND ALBUTEROL SULFATE 3 ML: .5; 3 SOLUTION RESPIRATORY (INHALATION) at 12:14

## 2017-01-01 RX ADMIN — FAMOTIDINE 20 MG: 20 TABLET, FILM COATED ORAL at 09:04

## 2017-01-01 RX ADMIN — IPRATROPIUM BROMIDE AND ALBUTEROL SULFATE 3 ML: .5; 3 SOLUTION RESPIRATORY (INHALATION) at 23:25

## 2017-01-01 RX ADMIN — FAMOTIDINE 20 MG: 20 TABLET, FILM COATED ORAL at 09:10

## 2017-01-01 RX ADMIN — GUAIFENESIN 1200 MG: 600 TABLET, EXTENDED RELEASE ORAL at 10:47

## 2017-01-01 RX ADMIN — BUDESONIDE AND FORMOTEROL FUMARATE DIHYDRATE 2 PUFF: 160; 4.5 AEROSOL RESPIRATORY (INHALATION) at 06:59

## 2017-07-02 PROBLEM — J96.00 ACUTE RESPIRATORY FAILURE (HCC): Status: ACTIVE | Noted: 2017-01-01

## 2017-07-02 NOTE — CONSULTS
PULMONARY & CRITICAL CARE CONSULT - [facility]  17, 2:00 PM  Patient Care Team:  Fadi Daniels MD as PCP - General (Family Medicine)  John E Broadbent, MD as Cardiologist (Cardiology)  Name: Melanie Stovall   : 1950  MRN: [id number]  Contact Serial Number [id number]  Chief complaint: COPD  HPI:  We have been consulted by Fadi Pabon DO to see this 66 y.o. year old female born on 1950.  Patient complains of dyspnea in the chest for 1 day. Severity: profound and improving.  Aggravating factors: none.   Alleviating factors: medication(s) (intubation and mechanical ventilation) Associated symptoms: wheezing. Sputum is scant.  Patient currently is on oxygen at unknown L/min per nasal cannula.. Respiratory history: chronic bronchitis, COPD and tobacco abuse which continues  She went to [facility] with these symptoms yesterday and ended up intubated.  She was transferred here where she was extubated.  She reports improvement in the shortness of breath.  Recent med changes include sotolol and eliquis started.  Past Medical History:  Past Medical History:   Diagnosis Date   • A-fib 2017   • Arthritis    • COPD (chronic obstructive pulmonary disease)    • Disease of thyroid gland    • Hypertension    • Kidney donor     left kidney removed     Past Surgical History:   Procedure Laterality Date   • APPENDECTOMY     • BACK SURGERY     • HYSTERECTOMY     • NEPHRECTOMY Right    • TONSILLECTOMY       Allergies   Allergen Reactions   • Morphine And Related Anaphylaxis   • Ativan [Lorazepam]      Medications:    apixaban 5 mg Oral BID   diazePAM 10 mg Oral Q8H   doxycycline 100 mg Oral Q12H   famotidine 20 mg Intravenous BID   guaiFENesin 1,200 mg Oral Q12H   ipratropium-albuterol 3 mL Nebulization Q4H - RT   levothyroxine 150 mcg Oral Q AM   methylPREDNISolone sodium succinate 40 mg Intravenous Q6H   nicotine 1 patch Transdermal Q24H   sotalol 80 mg Oral BID         Family History:   brother  from diabetes.  RA, lung and heart disease.  Social History:   reports that she has been smoking Cigarettes.  She has a 45.00 pack-year smoking history. She has never used smokeless tobacco. She reports that she does not drink alcohol or use illicit drugs.     Review of Systems:  Review of Systems   Constitutional: Negative for chills and fever.   HENT: Negative for congestion and sinus pressure.    Respiratory: Positive for shortness of breath and wheezing. Negative for cough and stridor.    Cardiovascular: Negative for chest pain.   Gastrointestinal: Negative for vomiting.   Neurological: Negative for headaches.   Psychiatric/Behavioral: Negative for agitation.   All other systems reviewed and are negative.     Physical Exam:  Temp:  [98.9 °F (37.2 °C)] 98.9 °F (37.2 °C)  Heart Rate:  [62-77] 62  Resp:  [18-22] 22  BP: ()/() 130/79  FiO2 (%):  [30 %-100 %] 30 %  Intake/Output Summary (Last 24 hours) at 17 1400  Last data filed at 17 1100   Gross per 24 hour   Intake              250 ml   Output              350 ml   Net             -100 ml     Last 3 weights    17  0314   Weight: 81 lb 12.8 oz (37.1 kg)     SpO2 Readings from Last 3 Encounters:   17 100%     Physical Exam   Constitutional: She appears well-developed. She is active.  Non-toxic appearance. She does not have a sickly appearance. She appears ill. No distress. Nasal cannula in place.   HENT:   Head: Normocephalic and atraumatic.   Right Ear: External ear normal.   Left Ear: External ear normal.   Nose: Nose normal.   Eyes: EOM are normal. No scleral icterus.   Neck: No JVD present. No tracheal deviation present.   Cardiovascular: Normal rate and regular rhythm.    Pulmonary/Chest: Effort normal. No respiratory distress. She has wheezes. She has no rales.   Abdominal: Soft. There is no tenderness. There is no guarding.   Musculoskeletal: She exhibits no edema.   Neurological: She is  alert.   Skin: Skin is warm and dry. She is not diaphoretic.   Psychiatric: She has a normal mood and affect. Her behavior is normal.       Results from last 7 days  Lab Units 07/02/17  0346   WBC 10*3/mm3 11.11*   HEMOGLOBIN g/dL 13.3   PLATELETS 10*3/mm3 196       Results from last 7 days  Lab Units 07/02/17  0346   SODIUM mmol/L 141   POTASSIUM mmol/L 4.9   CO2 mmol/L 28.0   BUN mg/dL 12   CREATININE mg/dL 0.63   GLUCOSE mg/dL 125*       Results from last 7 days  Lab Units 07/02/17  0730 07/02/17  0409 07/02/17  0311   PH, ARTERIAL pH units 7.367 7.242* 7.179*   PCO2, ARTERIAL mm Hg 46.1* 54.6* 73.5*   PO2 ART mm Hg 88.8 84.4 571.9*   FIO2 % 30 30 100     Lab Results   Component Value Date    PROBNP 256.0 07/02/2017      Troponin I 0.144 (H) ng/mL  Total Protein 6.5 g/dL     Albumin 3.90 g/dL     ALT (SGPT) 82 (H) U/L     AST (SGOT) 98 (H) U/L     Alkaline Phosphatase 81 U/L     Total Bilirubin 0.6 mg/dL  RBC, UA 0-2 (A) /HPF     WBC, UA 0-2 (A) /HPF     Bacteria, UA None Seen /HPF     Squamous Epithelial Cells, UA 0-2 /HPF     Hyaline Casts, UA None Seen /LPF     Methodology Manual Light Microscopy  Color, UA Yellow     Appearance, UA Clear     pH, UA 6.5     Specific Gravity, UA 1.013     Glucose, UA Negative     Ketones, UA Trace (A)     Bilirubin, UA Negative     Blood, UA Trace (A)     Protein, UA 30 mg/dL (1+) (A)     Leuk Esterase, UA Negative     Nitrite, UA Negative     Urobilinogen, UA 1.0 E.U./dL    Recent radiology:   Imaging Results (last 72 hours)     Procedure Component Value Units Date/Time    XR Chest 1 View [[id number]] Collected:  07/02/17 0840     Updated:  07/02/17 0844    Narrative:       HISTORY: Endotracheal tube placement  FINDINGS: Portable supine radiograph of the chest reveals the patient  has been intubated with the endotracheal tube well positioned. There are  emphysematous changes of the lungs with hyperinflation of the lung  parenchyma. There is no evidence of acute lobar pneumonia  or effusion.  There is mild gastric distention.    Impression:       1.. Patient intubated with endotracheal tube well-positioned.  2. Emphysematous changes of the lungs.  This report was finalized on 2017 08:40 by Dr. True Verdin MD.        My radiograph interpretation/independent review of imaging: severe copd changes. ett in place  Independent review of ekg: telemetry strip: sinus rhythm  Patient Active Problem List   Diagnosis   • Acute respiratory failure     Pulmonary Assessment:  New problem (to me), with additional workup planned: copd acute exacerbation, apparently improved  New problem (to me), no additional workup planned: paroxysmal atrial fibrillation  Other problems either stable, failing to improve or worsenin. Tobacco abuse.  Needs to stop smoking  2. Respiratory acidosis, better    Recommend/plan:   · Taper steroids  · Outpatient pft once back to baseline  · abg off vent  · Stop smoking  · Keep in icu    Electronically signed by Pj Mello MD, 17, 2:00 PM

## 2017-07-02 NOTE — PLAN OF CARE
Problem: Patient Care Overview (Adult)  Goal: Plan of Care Review  Outcome: Ongoing (interventions implemented as appropriate)    07/02/17 0539   Coping/Psychosocial Response Interventions   Plan Of Care Reviewed With patient   Patient Care Overview   Progress improving   Outcome Evaluation   Outcome Summary/Follow up Plan Patient pH at 0400 was 7.24 and pco2 was 54.6. Pt is resting. Respiratory has changed breathing treatments to q4 hrs because patient takes them every 3 hours at home.        Goal: Adult Individualization and Mutuality  Outcome: Ongoing (interventions implemented as appropriate)  Goal: Discharge Needs Assessment  Outcome: Ongoing (interventions implemented as appropriate)    Problem: Mechanical Ventilation, Invasive (Adult)  Goal: Signs and Symptoms of Listed Potential Problems Will be Absent or Manageable (Mechanical Ventilation, Invasive)  Outcome: Ongoing (interventions implemented as appropriate)    Problem: Fall Risk (Adult)  Goal: Identify Related Risk Factors and Signs and Symptoms  Outcome: Ongoing (interventions implemented as appropriate)  Goal: Absence of Falls  Outcome: Ongoing (interventions implemented as appropriate)    Problem: COPD, Chronic Bronchitis/Emphysema (Adult)  Goal: Signs and Symptoms of Listed Potential Problems Will be Absent or Manageable (COPD, Chronic Bronchitis/Emphysema)  Outcome: Ongoing (interventions implemented as appropriate)    Problem: Anxiety (Adult)  Goal: Identify Related Risk Factors and Signs and Symptoms  Outcome: Ongoing (interventions implemented as appropriate)  Goal: Reduction/Resolution  Outcome: Ongoing (interventions implemented as appropriate)    Problem: Pressure Ulcer Risk (Timothy Scale) (Adult,Obstetrics,Pediatric)  Goal: Identify Related Risk Factors and Signs and Symptoms  Outcome: Ongoing (interventions implemented as appropriate)  Goal: Skin Integrity  Outcome: Ongoing (interventions implemented as appropriate)

## 2017-07-02 NOTE — H&P
[facility]  HISTORY AND PHYSICAL    Date of Admission: 2017     Primary Care Physician:  Fadi Daniels MD and John Broadbent, MD    Subjective     Chief Complaint:  Acute on chronic respiratory failure    History of Present Illness:  The patient is a 66-year-old  female who presents to [facility] due to acute respiratory failure. She initially presented at [facility] and developed apnea within moments of her arrival.  She required emergent intubation and mechanical ventilation.  She was transferred to this facility for a higher level of care.  Presently she is resting comfortably and is in his no apparent distress.  She supported by mechanical ventilator.  She is not able to provide any meaningful history.    Her family relates that she developed sudden and worsening shortness of breath at approximately 3:00 PM yesterday.  No definite precipitating factors could be identified.  She has a long history of chronic lung disease and continues to smoke against strong medical advice.  At home, she uses oxygen continuously and bronchodilator nebulizer therapy on a regular schedule.    She was recently diagnosed with atrial fibrillation and started on Sotalol and Eliquis.  Presently, telemetry monitor demonstrates NSR.    Presently she is resting comfortably in the CCU.  I have had an opportunity to speak with her  and daughter.    Review of Systems:  Cannot be determined due to present status.    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  She is .  She has 2 sons and a daughter all in apparent good health.  She is disabled due to chronic lung disease.  She has a 10th grade education.  She smokes a pack of cigarettes per day and has smoked her entire adult life.  She has no known history of alcohol or drug use.  She is Jewish.  She has no recent history of travel outside this region.    Family History:   She has a brother now  due to complications of diabetes.  She has 1 sister with a history of rheumatoid arthritis.  Her father is  due to chronic lung disease.  Her mother is  due to heart attack.  Her mother also had a history of diabetes.    Allergies:  Allergies   Allergen Reactions   • Morphine And Related Anaphylaxis   • Ativan [Lorazepam]      Medications:  Her usual home medications are not available for review at this time.  Her family lacks insight regarding the names, doses, and schedules are her usual home medications.  I have asked the family to make an effort to obtain that information for review.          Objective     Vital Signs: Ht 64\" (162.6 cm)  LMP  (LMP Unknown)  SpO2 100%     Physical Exam   HENT:   Head: Normocephalic and atraumatic.   Nose: Nose normal.   Eyes: Right eye exhibits no discharge. Left eye exhibits no discharge. No scleral icterus.   Neck: Neck supple. No JVD present.   Cardiovascular: Normal rate, regular rhythm and normal heart sounds.  Exam reveals no gallop and no friction rub.    No murmur heard.  Pulmonary/Chest: No stridor. She is in respiratory distress. She has wheezes.   She is supported by a mechanical ventilator.   Abdominal: Soft. She exhibits no distension. There is no tenderness. There is no guarding.   Musculoskeletal: She exhibits no edema, tenderness or deformity.   Neurological: She is alert. No cranial nerve deficit. She exhibits normal muscle tone. Coordination normal.   Skin: Skin is warm and dry. She is not diaphoretic. No erythema. "     Results Reviewed:  Lab Results (last 24 hours)     ** No results found for the last 24 hours. **        Imaging Results (last 24 hours)     ** No results found for the last 24 hours. **        I have personally reviewed and interpreted the radiology studies and ECG obtained at time of admission.     Assessment / Plan     Assessment & Plan     Impression:  Acute on chrornic hypoxic and hypercarbic respiratory failure  COPD / Tobacco use  PAF  Hypothyroidism    Plan:  At this time, Mrs. Stovall will be admitted to [facility] for further evaluation and treatment.  She will be housed in the CCU.  Her condition at this time is judged to be guarded.  Her admitting diagnoses are as noted above.    I have asked the nursing staff to obtain vital signs per protocol.  She is to be confined to bed rest.  She is reported to be allergic to Morphine and Ativan.  I have asked nursing staff to monitor input and output.  Daily weights are to be obtained.  She will be held nothing by mouth pending improvement in her status.  IV fluids will be saline locked.  Mechanical ventilator adjustments will be made based on arterial blood gas analysis.  She is a full code.  Fall precautions are to be instituted.    She will be started on Levaquin 500 milligrams IV daily.  In addition she will be started on Solu-Medrol 80 mg IV every 8 hours.  Additional medications will include Lovenox and Protonix.  I'll make adjustments in her medications after review of her studies.    Follow-up laboratory studies are pending at this time.  Likewise chest x-ray and EKG are pending.    LACEY follow Mrs. Stovall closely through the night pending at her the hospitalist team in the morning.  The nursing staff to call should they have any questions or concerns.  These refer to the medical record for additional information orders and/or comments.     I discussed the patients findings and my recommendations with her  and daughter.    Keith MUÑOZ  MD Francis   07/02/17   3:04 AM

## 2017-07-02 NOTE — PLAN OF CARE
Problem: Fall Risk (Adult)  Goal: Identify Related Risk Factors and Signs and Symptoms  Outcome: Ongoing (interventions implemented as appropriate)  Patient extubated this morning, maintaining on 3l nc. She hasn't had much appetite, low activity tolerance. Continuing most home meds, including pain and anxiety. Low grade temperature noted.    07/02/17 1829   Fall Risk   Fall Risk: Related Risk Factors culprit medication(s);depression/anxiety;environment unfamiliar   Fall Risk: Signs and Symptoms presence of risk factors       Goal: Absence of Falls  Outcome: Ongoing (interventions implemented as appropriate)    Problem: COPD, Chronic Bronchitis/Emphysema (Adult)  Goal: Signs and Symptoms of Listed Potential Problems Will be Absent or Manageable (COPD, Chronic Bronchitis/Emphysema)  Outcome: Ongoing (interventions implemented as appropriate)    Problem: Anxiety (Adult)  Goal: Identify Related Risk Factors and Signs and Symptoms  Outcome: Ongoing (interventions implemented as appropriate)  Goal: Reduction/Resolution  Outcome: Ongoing (interventions implemented as appropriate)    Problem: Pressure Ulcer Risk (Timothy Scale) (Adult,Obstetrics,Pediatric)  Goal: Identify Related Risk Factors and Signs and Symptoms  Outcome: Ongoing (interventions implemented as appropriate)  Goal: Skin Integrity  Outcome: Ongoing (interventions implemented as appropriate)

## 2017-07-02 NOTE — PROGRESS NOTES
Discharge Planning Assessment  [facility]     Patient Name: Melanie Stovall  MRN: [id number]  Today's Date: 7/2/2017    Admit Date: 7/2/2017          Discharge Needs Assessment       07/02/17 0924    Living Environment    Lives With spouse   grandchildren    Home Accessibility no concerns    Stair Railings at Home none    Type of Financial/Environmental Concern none    Transportation Available car;family or friend will provide    Living Environment    Provides Primary Care For no one    Quality Of Family Relationships supportive    Able to Return to Prior Living Arrangements yes    Discharge Needs Assessment    Concerns To Be Addressed no discharge needs identified    Readmission Within The Last 30 Days no previous admission in last 30 days    Anticipated Changes Related to Illness none    Equipment Currently Used at Home oxygen    Equipment Needed After Discharge none    Discharge Facility/Level Of Care Needs home with home health            Discharge Plan     None        Discharge Placement     No information found                Demographic Summary     None            Functional Status     None            Psychosocial     None            Abuse/Neglect     None            Legal     None            Substance Abuse     None            Patient Forms     None          MELVINA SahniW

## 2017-07-03 NOTE — PROGRESS NOTES
"[facility] Pulmonary Progress Note    Patient: Melanie Stovall  1950   MR# [id number]   Acct# [id number]  07/03/17   8:06 AM  Referring Provider: Fadi Pabon DO      Problem list:   Patient Active Problem List   Diagnosis   • Acute respiratory failure      Chief Complaint: COPD exacerbation, hypercapnia    Interval history: Respiratory acidosis on this mornings ABG's. She's awake but lethargic stating \"I never want that tube again\". RN reports that the  has been consulted to assist patient with living will, in the meantime we will make her a DNI per her wishes. She has apparently had some type of positive pressure airway device at home in the past but was intolerant of the mask. She said she will try the BiPAP this morning but she's not sure she will \"handle it\". O2 sat is 100% on 2 liters NC O2. She denies shortness of air or pain. No other aggravating, alleviating factors or associated symptoms.    HPI    Allergy:   Allergies   Allergen Reactions   • Morphine And Related Anaphylaxis   • Ativan [Lorazepam]        Meds:      apixaban 5 mg Oral BID   diazePAM 10 mg Oral Q8H   doxycycline 100 mg Oral Q12H   famotidine 20 mg Oral BID   guaiFENesin 1,200 mg Oral Q12H   ipratropium-albuterol 3 mL Nebulization Q4H - RT   levothyroxine 150 mcg Oral Q AM   methylPREDNISolone sodium succinate 40 mg Intravenous Q6H   nicotine 1 patch Transdermal Q24H   sotalol 80 mg Oral BID        Review of Systems  Constitutional: Negative for chills and fever.   HENT: Negative for congestion and sinus pressure.   Respiratory: Positive for shortness of breath (improving) and wheezing. Negative for cough and stridor.   Cardiovascular: Negative for chest pain.   Gastrointestinal: Negative for vomiting.   Neurological: Negative for headaches.   Psychiatric/Behavioral: Negative for agitation.   All other systems reviewed and are negative.    Physical Exam:  Vitals:    07/03/17 0800   BP:    Pulse: 60   Resp:    Temp:    SpO2: 98% "       Intake/Output Summary (Last 24 hours) at 07/03/17 0806  Last data filed at 07/03/17 0600   Gross per 24 hour   Intake              500 ml   Output             1350 ml   Net             -850 ml     Physical Exam  Constitutional: She appears well-developed. Non-toxic appearance. She appears chronically ill. No distress. Nasal cannula in place.   HENT:   Head: Normocephalic and atraumatic.   Nose: Nose normal.   Eyes: EOM are normal. No scleral icterus.   Neck: No JVD present. No tracheal deviation present.   Cardiovascular: Normal rate and regular rhythm.   Pulmonary/Chest: Effort normal. No respiratory distress. She has no wheezes. She has no rales.   Abdominal: Soft. There is no tenderness. There is no guarding.   Musculoskeletal: She exhibits no edema.   Neurological: She is alert but somewhat lethargic.   Skin: Skin is warm and dry. She is not diaphoretic.   Psychiatric: She has a normal mood and affect. Her behavior is normal.     Data:     Results from last 7 days  Lab Units 07/03/17  0201 07/02/17  0346   WBC 10*3/mm3 3.41* 11.11*   HEMOGLOBIN g/dL 11.7* 13.3   PLATELETS 10*3/mm3 206 196         Results from last 7 days  Lab Units 07/03/17  0201 07/02/17  0346   SODIUM mmol/L 139 141   POTASSIUM mmol/L 4.8 4.9   BUN mg/dL 15 12   CREATININE mg/dL 0.55 0.63         Results from last 7 days  Lab Units 07/03/17  0640 07/02/17  0730 07/02/17  0409 07/02/17  0311   PH, ARTERIAL pH units 7.299* 7.367 7.242* 7.179*   PCO2, ARTERIAL mm Hg 68.3* 46.1* 54.6* 73.5*   PO2 ART mm Hg 89.4 88.8 84.4 571.9*   FIO2 %  --  30 30 100     Radiology:  Imaging Results (last 24 hours)     Procedure Component Value Units Date/Time    XR Chest 1 View [[id number]] Collected:  07/02/17 0840     Updated:  07/02/17 0844    Narrative:       HISTORY: Endotracheal tube placement     FINDINGS: Portable supine radiograph of the chest reveals the patient  has been intubated with the endotracheal tube well positioned. There are  emphysematous  changes of the lungs with hyperinflation of the lung  parenchyma. There is no evidence of acute lobar pneumonia or effusion.  There is mild gastric distention.       Impression:       1.. Patient intubated with endotracheal tube well-positioned.  2. Emphysematous changes of the lungs.  This report was finalized on 07/02/2017 08:40 by Dr. True Verdin MD.        Pulmonary Assessment:  1. COPD, acute exacerbation  2. Tobacco abuse  3. Respiratory acidosis  4.  Acute on chronic hypercapnic respiratory failure     Plan:   · Orders placed to make her a DNI per her wishes  · Initiate BiPAP this morning  · Follow-up ABGs  · Decreased daily Valium from 10 mg 3 times a day to 5 mg 3 times a day  · Discussed and recommended total smoking cessation to continue after discharge  · Continue in ICU until respiratory acidosis improves    Electronically signed by LICO Minaya, 07/03/17, 8:06 AM     Physician substantive contribution:  Pertinent symptoms/interval history include: shortness of breath better in afternoon with use of bipap earlier  Respiratory exam shows pertinent findings of:diminished breath sounds, wheezing, improved.  Plan includes: continue bipap as needed.  May be candidate for home ppv.  Stop smoking for sure!  I have seen and examined patient personally, performing a face-to-face diagnostic evaluation with plan of care reviewed and developed with APRN and nursing staff. I have addended and/or modified the above history of present illness, physical examination, and assessment and plan to reflect my findings and impressions. Essential elements of the care plan were discussed with APRN above.  Agree with findings and assessment/plan as documented above.    Electronically signed by Pj Mello MD, on 7/3/2017, 4:19 PM

## 2017-07-03 NOTE — PAYOR COMM NOTE
"FROM: CASS THOMAS  PHONE: [phone]  FAX: [phone]    ID: [id number]    Arnaldo Boone (66 y.o. Female)     Date of Birth Social Security Number Address Home Phone MRN    1950  [address] [place] [address] [phone] [id number]    Bahai Marital Status          Unknown        Admission Date Admission Type Admitting Provider Attending Provider Department, Room/Bed    7/2/17 Urgent Fadi Pabon DO Hancock, John C, DO [facility] CARDIAC CARE, C006/1    Discharge Date Discharge Disposition Discharge Destination                      Attending Provider: Fadi Pabon DO     Allergies:  Morphine And Related, Ativan [Lorazepam]    Isolation:  None   Infection:  None   Code Status:  Conditional    Ht:  64\" (162.6 cm)   Wt:  81 lb 12.8 oz (37.1 kg)    Admission Cmt:  None   Principal Problem:  None                Active Insurance as of 7/2/2017     Primary Coverage     Payor Plan Insurance Group Employer/Plan Group    MEDICARE MEDICARE A & B      Payor Plan Address Payor Plan Phone Number Effective From Effective To    [address] [phone] 10/1/2004     [place] [address]       Subscriber Name Subscriber Birth Date Member ID       ARNALDO BOONE 1950 [id number]           Secondary Coverage     Payor Plan Insurance Group Employer/Plan Group    [employer] [place] WELL[place] MEDICAID      Payor Plan Address Payor Plan Phone Number Effective From Effective To    [address] [address] [phone] 7/2/2017     [place] [address]       Subscriber Name Subscriber Birth Date Member ID       ARNALDO BOONE 1950 [id number]                 Emergency Contacts      (Rel.) Home Phone Work Phone Mobile Phone    Sis Boone (Daughter) -- -- [phone]    Santino Boone (Spouse) -- -- [phone]               History & Physical      Keith Blanco MD at 7/2/2017  3:04 AM              [facility] Medicine Services  HISTORY AND " PHYSICAL    Date of Admission: 2017     Primary Care Physician:  Fadi Daniels MD and John Broadbent, MD    Subjective     Chief Complaint:  Acute on chronic respiratory failure    History of Present Illness:  The patient is a 66-year-old  female who presents to [facility] due to acute respiratory failure. She initially presented at [facility] and developed apnea within moments of her arrival.  She required emergent intubation and mechanical ventilation.  She was transferred to this facility for a higher level of care.  Presently she is resting comfortably and is in his no apparent distress.  She supported by mechanical ventilator.  She is not able to provide any meaningful history.    Her family relates that she developed sudden and worsening shortness of breath at approximately 3:00 PM yesterday.  No definite precipitating factors could be identified.  She has a long history of chronic lung disease and continues to smoke against strong medical advice.  At home, she uses oxygen continuously and bronchodilator nebulizer therapy on a regular schedule.    She was recently diagnosed with atrial fibrillation and started on Sotalol and Eliquis.  Presently, telemetry monitor demonstrates NSR.    Presently she is resting comfortably in the CCU.  I have had an opportunity to speak with her  and daughter.    Review of Systems:  Cannot be determined due to present status.    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  She is .  She has 2 sons and a daughter all in  "apparent good health.  She is disabled due to chronic lung disease.  She has a 10th grade education.  She smokes a pack of cigarettes per day and has smoked her entire adult life.  She has no known history of alcohol or drug use.  She is [employer].  She has no recent history of travel outside this region.    Family History:   She has a brother now  due to complications of diabetes.  She has 1 sister with a history of rheumatoid arthritis.  Her father is  due to chronic lung disease.  Her mother is  due to heart attack.  Her mother also had a history of diabetes.    Allergies:  Allergies   Allergen Reactions   • Morphine And Related Anaphylaxis   • Ativan [Lorazepam]      Medications:  Her usual home medications are not available for review at this time.  Her family lacks insight regarding the names, doses, and schedules are her usual home medications.  I have asked the family to make an effort to obtain that information for review.          Objective     Vital Signs: Ht 64\" (162.6 cm)  LMP  (LMP Unknown)  SpO2 100%     Physical Exam   HENT:   Head: Normocephalic and atraumatic.   Nose: Nose normal.   Eyes: Right eye exhibits no discharge. Left eye exhibits no discharge. No scleral icterus.   Neck: Neck supple. No JVD present.   Cardiovascular: Normal rate, regular rhythm and normal heart sounds.  Exam reveals no gallop and no friction rub.    No murmur heard.  Pulmonary/Chest: No stridor. She is in respiratory distress. She has wheezes.   She is supported by a mechanical ventilator.   Abdominal: Soft. She exhibits no distension. There is no tenderness. There is no guarding.   Musculoskeletal: She exhibits no edema, tenderness or deformity.   Neurological: She is alert. No cranial nerve deficit. She exhibits normal muscle tone. Coordination normal.   Skin: Skin is warm and dry. She is not diaphoretic. No erythema.     Results Reviewed:  Lab Results (last 24 hours)     ** No results found for " the last 24 hours. **        Imaging Results (last 24 hours)     ** No results found for the last 24 hours. **        I have personally reviewed and interpreted the radiology studies and ECG obtained at time of admission.     Assessment / Plan     Assessment & Plan     Impression:  Acute on chrornic hypoxic and hypercarbic respiratory failure  COPD / Tobacco use  PAF  Hypothyroidism    Plan:  At this time, Mrs. Stovall will be admitted to [facility] for further evaluation and treatment.  She will be housed in the CCU.  Her condition at this time is judged to be guarded.  Her admitting diagnoses are as noted above.    I have asked the nursing staff to obtain vital signs per protocol.  She is to be confined to bed rest.  She is reported to be allergic to Morphine and Ativan.  I have asked nursing staff to monitor input and output.  Daily weights are to be obtained.  She will be held nothing by mouth pending improvement in her status.  IV fluids will be saline locked.  Mechanical ventilator adjustments will be made based on arterial blood gas analysis.  She is a full code.  Fall precautions are to be instituted.    She will be started on Levaquin 500 milligrams IV daily.  In addition she will be started on Solu-Medrol 80 mg IV every 8 hours.  Additional medications will include Lovenox and Protonix.  I'll make adjustments in her medications after review of her studies.    Follow-up laboratory studies are pending at this time.  Likewise chest x-ray and EKG are pending.    LACEY follow Mrs. Stovall closely through the night pending at her the hospitalist team in the morning.  The nursing staff to call should they have any questions or concerns.  These refer to the medical record for additional information orders and/or comments.     I discussed the patients findings and my recommendations with her  and daughter.    Keith Blanco MD   07/02/17   3:04 AM             Electronically signed by Keith Blanco,  MD at 7/2/2017  3:39 AM        Emergency Department Notes     No notes of this type exist for this encounter.        Vital Signs (last 72 hrs)       06/30 0700  -  07/01 0659 07/01 0700  -  07/02 0659 07/02 0700  -  07/03 0659 07/03 0700  -  07/03 0957   Most Recent    Temp (°F)     98.9 -  99.8       99.3 (37.4)    Heart Rate   70 -  77    50 -  76    51 -  60     57    Resp   18 -  21    17 -  25    22 -  27     27    BP   95/46 -  126/75    97/51 -  154/96    131/63 -  141/68     141/68    SpO2 (%)   98 -  100    91 -  100    96 -  99     99          Hospital Medications (all)       Dose Frequency Start End    acetaminophen (TYLENOL) tablet 325 mg 325 mg Every 4 Hours PRN 7/2/2017     Sig - Route: Take 1 tablet by mouth Every 4 (Four) Hours As Needed for Mild Pain (1-3) or Fever. - Oral    apixaban (ELIQUIS) tablet 5 mg 5 mg 2 Times Daily 7/2/2017     Sig - Route: Take 1 tablet by mouth 2 (Two) Times a Day. - Oral    artificial tears (LUBRIFRESH P.M.) ophthalmic ointment  Every 4 Hours PRN 7/2/2017     Sig - Route: Apply  topically Every 4 (Four) Hours As Needed (irritated eyes). - Topical    budesonide-formoterol (SYMBICORT) 160-4.5 MCG/ACT inhaler 2 puff 2 puff 2 Times Daily - RT 7/3/2017     Sig - Route: Inhale 2 puffs 2 (Two) Times a Day. - Inhalation    diazePAM (VALIUM) tablet 5 mg 5 mg Every 8 Hours Scheduled 7/3/2017     Sig - Route: Take 1 tablet by mouth Every 8 (Eight) Hours. - Oral    doxycycline (VIBRAMYICN) tablet 100 mg 100 mg Every 12 Hours Scheduled 7/2/2017     Sig - Route: Take 1 tablet by mouth Every 12 (Twelve) Hours. - Oral    famotidine (PEPCID) tablet 20 mg 20 mg 2 Times Daily 7/2/2017     Sig - Route: Take 1 tablet by mouth 2 (Two) Times a Day. - Oral    guaiFENesin (MUCINEX) 12 hr tablet 1,200 mg 1,200 mg Every 12 Hours Scheduled 7/2/2017     Sig - Route: Take 2 tablets by mouth Every 12 (Twelve) Hours. - Oral    HYDROcodone-acetaminophen (NORCO)  MG per tablet 1 tablet 1 tablet  Every 6 Hours PRN 7/2/2017     Sig - Route: Take 1 tablet by mouth Every 6 (Six) Hours As Needed for Moderate Pain (4-6). - Oral    hydrOXYzine (VISTARIL) capsule 50 mg 50 mg Every 6 Hours PRN 7/3/2017     Sig - Route: Take 1 capsule by mouth Every 6 (Six) Hours As Needed for Anxiety. - Oral    hydrOXYzine (VISTARIL) injection 50 mg 50 mg Once 7/2/2017 7/2/2017    Sig - Route: Inject 1 mL into the shoulder, thigh, or buttocks 1 (One) Time. - Intramuscular    ipratropium-albuterol (DUO-NEB) nebulizer solution 3 mL 3 mL Every 4 Hours - RT 7/2/2017     Sig - Route: Take 3 mL by nebulization Every 4 (Four) Hours. - Nebulization    levothyroxine (SYNTHROID, LEVOTHROID) tablet 150 mcg 150 mcg Every Early Morning 7/2/2017     Sig - Route: Take 1 tablet by mouth Every Morning. - Oral    methylPREDNISolone sodium succinate (SOLU-Medrol) injection 40 mg 40 mg Every 6 Hours 7/2/2017     Sig - Route: Infuse 1 mL into a venous catheter Every 6 (Six) Hours. - Intravenous    nicotine (NICODERM CQ) 14 MG/24HR patch 1 patch 1 patch Every 24 Hours 7/2/2017     Sig - Route: Place 1 patch on the skin Daily. - Transdermal    ondansetron (ZOFRAN) injection 4 mg 4 mg Every 6 Hours PRN 7/2/2017     Sig - Route: Infuse 2 mL into a venous catheter Every 6 (Six) Hours As Needed for Nausea or Vomiting. - Intravenous    sodium chloride 0.9 % flush 1-10 mL 1-10 mL As Needed 7/2/2017     Sig - Route: Infuse 1-10 mL into a venous catheter As Needed for Line Care. - Intravenous    sotalol (BETAPACE) tablet 80 mg 80 mg 2 Times Daily 7/2/2017     Sig - Route: Take 1 tablet by mouth 2 (Two) Times a Day. - Oral    diazePAM (VALIUM) tablet 10 mg (Discontinued) 10 mg Every 8 Hours Scheduled 7/2/2017 7/3/2017    Sig - Route: Take 1 tablet by mouth Every 8 (Eight) Hours. - Oral    doxycycline (MONODOX) capsule 100 mg (Discontinued) 100 mg Every 12 Hours Scheduled 7/2/2017 7/2/2017    Sig - Route: Take 2 capsules by mouth Every 12 (Twelve) Hours. - Oral     Reason for Discontinue: Reorder    enoxaparin (LOVENOX) syringe 40 mg (Discontinued) 40 mg Daily 7/2/2017 7/2/2017    Sig - Route: Inject 0.4 mL under the skin Daily. - Subcutaneous    famotidine (PEPCID) injection 20 mg (Discontinued) 20 mg 2 Times Daily 7/2/2017 7/2/2017    Sig - Route: Infuse 2 mL into a venous catheter 2 (Two) Times a Day. - Intravenous    Reason for Discontinue: Formulary change    hydrOXYzine (VISTARIL) capsule 25 mg (Discontinued) 25 mg Every 6 Hours PRN 7/2/2017 7/3/2017    Sig - Route: Take 1 capsule by mouth Every 6 (Six) Hours As Needed for Anxiety. - Oral    ipratropium-albuterol (DUO-NEB) nebulizer solution 3 mL (Discontinued) 3 mL Every 4 Hours PRN 7/2/2017 7/2/2017    Sig - Route: Take 3 mL by nebulization Every 4 (Four) Hours As Needed for Wheezing or Shortness of Air. - Nebulization    ipratropium-albuterol (DUO-NEB) nebulizer solution 3 mL (Discontinued) 3 mL Every 6 Hours - RT 7/2/2017 7/2/2017    Sig - Route: Take 3 mL by nebulization Every 6 (Six) Hours. - Nebulization    levoFLOXacin (LEVAQUIN) 500 mg/100 mL D5W (premix) (LEVAQUIN) 500 mg (Discontinued) 500 mg Every 24 Hours 7/2/2017 7/2/2017    Sig - Route: Infuse 100 mL into a venous catheter Daily. - Intravenous    levothyroxine sodium injection 75 mcg (Discontinued) 75 mcg Daily at 1100 7/2/2017 7/2/2017    Sig - Route: Infuse 3.75 mL into a venous catheter Daily. - Intravenous    methylPREDNISolone sodium succinate (SOLU-Medrol) injection 80 mg (Discontinued) 80 mg Every 8 Hours 7/2/2017 7/2/2017    Sig - Route: Infuse 1.28 mL into a venous catheter Every 8 (Eight) Hours. - Intravenous    pantoprazole (PROTONIX) injection 40 mg (Discontinued) 40 mg Every Early Morning 7/2/2017 7/2/2017    Sig - Route: Infuse 10 mL into a venous catheter Every Morning. - Intravenous    Reason for Discontinue: Formulary change          Lab Results (last 72 hours)     Procedure Component Value Units Date/Time    Blood Gas, Arterial  [[id number]]  (Abnormal) Collected:  07/02/17 0311    Specimen:  Arterial Blood Updated:  07/02/17 0316     Site Arterial: right radial     Shahram's Test --      Documented in Rapid Comm        pH, Arterial 7.179 (C) pH units      pCO2, Arterial 73.5 (C) mm Hg      pO2, Arterial 571.9 (H) mm Hg      HCO3, Arterial 26.8 (H) mmol/L      Base Excess, Arterial -3.5 (L) mmol/L      O2 Saturation, Arterial 99.9 %      O2 Saturation Calculated 99.9 %      Barometric Pressure for Blood Gas -- mmHg       Component not reported at this site.        Modality Ventilator     FIO2 100 %      Ventilator Mode AC     Rate 14.0 Breaths/minute      PEEP 5.0     Notified Who Mallory Haase [id number] RN     Notified By Yoko Mello RRT     Notified Time 7/2/2017 3:16:17 AM     Vent CPAP/PEEP 5.0    Narrative:       Serial Number: [id number]    : [id number]    Comprehensive Metabolic Panel [[id number]]  (Abnormal) Collected:  07/02/17 0346    Specimen:  Blood Updated:  07/02/17 0412     Glucose 125 (H) mg/dL      BUN 12 mg/dL      Creatinine 0.63 mg/dL      Sodium 141 mmol/L      Potassium 4.9 mmol/L      Chloride 103 mmol/L      CO2 28.0 mmol/L      Calcium 9.2 mg/dL      Total Protein 6.5 g/dL      Albumin 3.90 g/dL      ALT (SGPT) 82 (H) U/L      AST (SGOT) 98 (H) U/L      Alkaline Phosphatase 81 U/L      Total Bilirubin 0.6 mg/dL      eGFR Non African Amer 95 mL/min/1.73      Globulin 2.6 gm/dL      A/G Ratio 1.5 g/dL      BUN/Creatinine Ratio 19.0     Anion Gap 10.0 mmol/L     Blood Gas, Arterial [[id number]]  (Abnormal) Collected:  07/02/17 0409    Specimen:  Arterial Blood Updated:  07/02/17 0414     Site Arterial: right radial     Shahram's Test --      Documented in Rapid Comm        pH, Arterial 7.242 (C) pH units      pCO2, Arterial 54.6 (H) mm Hg      pO2, Arterial 84.4 mm Hg      HCO3, Arterial 23.0 mmol/L      Base Excess, Arterial -5.1 (L) mmol/L      O2 Saturation, Arterial 94.5 %      O2 Saturation Calculated 94.5 %       Barometric Pressure for Blood Gas -- mmHg       Component not reported at this site.        Modality Ventilator     FIO2 30 %      Ventilator Mode AC     Rate 18.0 Breaths/minute      PEEP 5.0     Notified Who MALLORY HAASE RN [id number]     Notified By DRISS AGUIRRE RRT [id number]     Notified Time 7/2/2017 4:13:47 AM     Vent CPAP/PEEP 5.0    Narrative:       Serial Number: [id number]    : [id number]    Protime-INR [[id number]]  (Normal) Collected:  07/02/17 0346    Specimen:  Blood Updated:  07/02/17 0424     Protime 14.2 Seconds      INR 1.07    aPTT [[id number]]  (Normal) Collected:  07/02/17 0346    Specimen:  Blood Updated:  07/02/17 0424     PTT 25.2 seconds     Troponin [[id number]]  (Abnormal) Collected:  07/02/17 0346    Specimen:  Blood Updated:  07/02/17 0424     Troponin I 0.039 (H) ng/mL     BNP [[id number]]  (Normal) Collected:  07/02/17 0346    Specimen:  Blood Updated:  07/02/17 0424     proBNP 256.0 pg/mL     CBC & Differential [[id number]] Collected:  07/02/17 0346    Specimen:  Blood Updated:  07/02/17 0552    Narrative:       The following orders were created for panel order CBC & Differential.  Procedure                               Abnormality         Status                     ---------                               -----------         ------                     CBC Auto Differential[[id number]]        Abnormal            Final result                 Please view results for these tests on the individual orders.    CBC Auto Differential [[id number]]  (Abnormal) Collected:  07/02/17 0346    Specimen:  Blood Updated:  07/02/17 0552     WBC 11.11 (H) 10*3/mm3      RBC 5.06 10*6/mm3      Hemoglobin 13.3 g/dL      Hematocrit 45.4 %      MCV 89.7 fL      MCH 26.3 (L) pg      MCHC 29.3 (L) g/dL      RDW 13.2 %      RDW-SD 43.4 fl      MPV 10.2 fL      Platelets 196 10*3/mm3      Neutrophil % 95.0 (H) %      Lymphocyte % 3.1 (L) %      Monocyte % 1.1 (L) %      Eosinophil % 0.5 %      Basophil % 0.1 %      Immature  Grans % 0.2 %      Neutrophils, Absolute 10.57 (H) 10*3/mm3      Lymphocytes, Absolute 0.34 (L) 10*3/mm3      Monocytes, Absolute 0.12 (L) 10*3/mm3      Eosinophils, Absolute 0.05 10*3/mm3      Basophils, Absolute 0.01 10*3/mm3      Immature Grans, Absolute 0.02 10*3/mm3      nRBC 0.0 /100 WBC     Urinalysis With / Microscopic If Indicated [[id number]]  (Abnormal) Collected:  07/02/17 0530    Specimen:  Urine from Urine, Catheter Updated:  07/02/17 0703     Color, UA Yellow     Appearance, UA Clear     pH, UA 6.5     Specific Gravity, UA 1.013     Glucose, UA Negative     Ketones, UA Trace (A)     Bilirubin, UA Negative     Blood, UA Trace (A)     Protein, UA 30 mg/dL (1+) (A)     Leuk Esterase, UA Negative     Nitrite, UA Negative     Urobilinogen, UA 1.0 E.U./dL    Urinalysis, Microscopic Only [[id number]]  (Abnormal) Collected:  07/02/17 0530    Specimen:  Urine from Urine, Catheter Updated:  07/02/17 0703     RBC, UA 0-2 (A) /HPF      WBC, UA 0-2 (A) /HPF      Bacteria, UA None Seen /HPF      Squamous Epithelial Cells, UA 0-2 /HPF      Hyaline Casts, UA None Seen /LPF      Methodology Manual Light Microscopy    Blood Gas, Arterial [[id number]]  (Abnormal) Collected:  07/02/17 0730    Specimen:  Arterial Blood Updated:  07/02/17 0733     Site Arterial: left radial     Shahram's Test --      Documented in Rapid Comm        pH, Arterial 7.367 pH units      pCO2, Arterial 46.1 (H) mm Hg      pO2, Arterial 88.8 mm Hg      HCO3, Arterial 25.9 mmol/L      Base Excess, Arterial 0.1 mmol/L      O2 Saturation, Arterial 96.5 %      O2 Saturation Calculated 96.5 %      Barometric Pressure for Blood Gas -- mmHg       Component not reported at this site.        Modality Ventilator     FIO2 30 %      Ventilator Mode AC     Rate 20.0 Breaths/minute      PEEP 5.0     Vent CPAP/PEEP 5.0    Narrative:       Serial Number: [id number]    : [id number]    Troponin [[id number]]  (Abnormal) Collected:  07/02/17 0938    Specimen:  Blood  Updated:  07/02/17 1010     Troponin I 0.144 (H) ng/mL     CBC (No Diff) [[id number]]  (Abnormal) Collected:  07/03/17 0201    Specimen:  Blood Updated:  07/03/17 0236     WBC 3.41 (L) 10*3/mm3      RBC 4.22 10*6/mm3      Hemoglobin 11.7 (L) g/dL      Hematocrit 37.4 %      MCV 88.6 fL      MCH 27.7 (L) pg      MCHC 31.3 (L) g/dL      RDW 13.1 %      RDW-SD 42.4 fl      MPV 10.1 fL      Platelets 206 10*3/mm3     Basic Metabolic Panel [[id number]]  (Abnormal) Collected:  07/03/17 0201    Specimen:  Blood Updated:  07/03/17 0241     Glucose 128 (H) mg/dL      BUN 15 mg/dL      Creatinine 0.55 mg/dL      Sodium 139 mmol/L      Potassium 4.8 mmol/L      Chloride 102 mmol/L      CO2 32.0 (H) mmol/L      Calcium 9.5 mg/dL      eGFR Non African Amer 111 mL/min/1.73      BUN/Creatinine Ratio 27.3 (H)     Anion Gap 5.0 mmol/L     Narrative:       GFR Normal >60  Chronic Kidney Disease <60  Kidney Failure <15    Blood Gas, Arterial [[id number]]  (Abnormal) Collected:  07/03/17 0640    Specimen:  Arterial Blood Updated:  07/03/17 0644     Site Arterial: right radial     Shahram's Test --      Documented in Rapid Comm        pH, Arterial 7.299 (L) pH units      pCO2, Arterial 68.3 (H) mm Hg      pO2, Arterial 89.4 mm Hg      HCO3, Arterial 32.8 (H) mmol/L      Base Excess, Arterial 4.0 (H) mmol/L      O2 Saturation, Arterial 95.7 %      O2 Saturation Calculated 95.7 %      Barometric Pressure for Blood Gas -- mmHg       Component not reported at this site.        Modality Cannula     Flow Rate 2.00 lpm     Narrative:       Serial Number: [id number]    : [id number]    Blood Gas, Arterial With Co-Ox [[id number]]  (Abnormal) Collected:  07/03/17 0928    Specimen:  Arterial Blood Updated:  07/03/17 0931     Site Arterial: left radial     Shahram's Test --      Documented in Rapid Comm        pH, Arterial 7.394 pH units      pCO2, Arterial 53.4 (H) mm Hg      pO2, Arterial 65.0 (L) mm Hg      HCO3, Arterial 31.9 (H) mmol/L      Base Excess,  Arterial 5.8 (H) mmol/L      Hemoglobin, Blood Gas 12.1 g/dL      Hematocrit, Blood Gas 36.0 (L) %      Oxyhemoglobin 91.9 (L) %      Methemoglobin 0.3 (L) %      Carboxyhemoglobin 0.9 %      Sodium, Arterial 135.8 mmol/L      Potassium, Arterial 4.32 mmol/L      Barometric Pressure for Blood Gas -- mmHg       Component not reported at this site.        Modality BiPAP     FIO2 30 %      IPAP 12     EPAP 6    Narrative:       Serial Number: [id number]    : [id number]          Imaging Results (last 72 hours)     Procedure Component Value Units Date/Time    XR Chest 1 View [[id number]] Collected:  07/02/17 0840     Updated:  07/02/17 0844    Narrative:       HISTORY: Endotracheal tube placement     FINDINGS: Portable supine radiograph of the chest reveals the patient  has been intubated with the endotracheal tube well positioned. There are  emphysematous changes of the lungs with hyperinflation of the lung  parenchyma. There is no evidence of acute lobar pneumonia or effusion.  There is mild gastric distention.       Impression:       1.. Patient intubated with endotracheal tube well-positioned.  2. Emphysematous changes of the lungs.  This report was finalized on 07/02/2017 08:40 by Dr. True Verdin MD.          ECG/EMG Results (last 72 hours)     ** No results found for the last 72 hours. **        Orders (last 72 hrs)     Start     Ordered    07/04/17 0600  Basic Metabolic Panel  Morning Draw      07/03/17 0843    07/04/17 0600  CBC (No Diff)  Morning Draw      07/03/17 0843    07/03/17 1400  diazePAM (VALIUM) tablet 5 mg  Every 8 Hours Scheduled      07/03/17 0827    07/03/17 0932  Blood Gas, Arterial With Co-Ox  Once      07/03/17 0931    07/03/17 0930  budesonide-formoterol (SYMBICORT) 160-4.5 MCG/ACT inhaler 2 puff  2 Times Daily - RT      07/03/17 0842    07/03/17 0845  hydrOXYzine (VISTARIL) capsule 50 mg  Every 6 Hours PRN      07/03/17 0845    07/03/17 0826  Conditional Code  Continuous      07/03/17 0827     07/03/17 0645  Blood Gas, Arterial  Once      07/03/17 0644    07/03/17 0603  Blood Gas, Arterial  STAT      07/03/17 0602    07/03/17 0600  CBC (No Diff)  Morning Draw      07/02/17 0917    07/03/17 0600  Basic Metabolic Panel  Morning Draw      07/02/17 0917    07/03/17 0600  Blood Gas, Arterial With Co-Ox  Once,   Status:  Canceled      07/02/17 1418    07/02/17 1800  apixaban (ELIQUIS) tablet 5 mg  2 Times Daily      07/02/17 0924    07/02/17 1800  famotidine (PEPCID) tablet 20 mg  2 Times Daily      07/02/17 1611    07/02/17 1130  ipratropium-albuterol (DUO-NEB) nebulizer solution 3 mL  Every 4 Hours - RT      07/02/17 0918    07/02/17 1100  levothyroxine sodium injection 75 mcg  Daily at 1100,   Status:  Discontinued      07/02/17 0330    07/02/17 1015  doxycycline (VIBRAMYICN) tablet 100 mg  Every 12 Hours Scheduled      07/02/17 0941    07/02/17 1004  methylPREDNISolone sodium succinate (SOLU-Medrol) injection 40 mg  Every 6 Hours      07/02/17 0916    07/02/17 1000  nicotine (NICODERM CQ) 14 MG/24HR patch 1 patch  Every 24 Hours      07/02/17 0916    07/02/17 1000  Incentive Spirometry  Every 2 Hours While Awake      07/02/17 0918    07/02/17 1000  doxycycline (MONODOX) capsule 100 mg  Every 12 Hours Scheduled,   Status:  Discontinued      07/02/17 0923    07/02/17 1000  diazePAM (VALIUM) tablet 10 mg  Every 8 Hours Scheduled,   Status:  Discontinued      07/02/17 0924    07/02/17 1000  sotalol (BETAPACE) tablet 80 mg  2 Times Daily      07/02/17 0924    07/02/17 0945  levothyroxine (SYNTHROID, LEVOTHROID) tablet 150 mcg  Every Early Morning      07/02/17 0924    07/02/17 0930  guaiFENesin (MUCINEX) 12 hr tablet 1,200 mg  Every 12 Hours Scheduled      07/02/17 0918    07/02/17 0923  HYDROcodone-acetaminophen (NORCO)  MG per tablet 1 tablet  Every 6 Hours PRN      07/02/17 0924    07/02/17 0923  acetaminophen (TYLENOL) tablet 325 mg  Every 4 Hours PRN      07/02/17 0924    07/02/17 0921  artificial  tears (LUBRIFRESH P.M.) ophthalmic ointment  Every 4 Hours PRN      07/02/17 0921    07/02/17 0920  hydrOXYzine (VISTARIL) capsule 25 mg  Every 6 Hours PRN,   Status:  Discontinued      07/02/17 0920    07/02/17 0918  Diet Regular  Diet Effective Now      07/02/17 0917    07/02/17 0917  Troponin  STAT      07/02/17 0917    07/02/17 0917  NIPPV CPAP OR BIPAP  Until Discontinued     Comments:  Respiratory to place and adjust as needed.    07/02/17 0917    07/02/17 0916  Inpatient Consult to Pulmonology  Once     Specialty:  Pulmonary Disease  Provider:  Pj Mello MD    07/02/17 0915    07/02/17 0900  enoxaparin (LOVENOX) syringe 40 mg  Daily,   Status:  Discontinued      07/02/17 0302    07/02/17 0900  famotidine (PEPCID) injection 20 mg  2 Times Daily,   Status:  Discontinued      07/02/17 0557    07/02/17 0830  hydrOXYzine (VISTARIL) injection 50 mg  Once      07/02/17 0749    07/02/17 0750  Extubation  Once      07/02/17 0749    07/02/17 0734  Blood Gas, Arterial  Once      07/02/17 0733    07/02/17 0700  ipratropium-albuterol (DUO-NEB) nebulizer solution 3 mL  Every 6 Hours - RT,   Status:  Discontinued      07/02/17 0405    07/02/17 0600  pantoprazole (PROTONIX) injection 40 mg  Every Early Morning,   Status:  Discontinued      07/02/17 0303    07/02/17 0541  Urinalysis, Microscopic Only  Once      07/02/17 0540    07/02/17 0415  Blood Gas, Arterial  Once      07/02/17 0414    07/02/17 0400  Vital Signs Every Hour and Per Hospital Policy Based on Patient Condition  Every Hour      07/02/17 0302    07/02/17 0400  Intake and Output  Every Hour      07/02/17 0302    07/02/17 0350  Urinalysis With / Microscopic If Indicated  Once      07/02/17 0358    07/02/17 0345  levoFLOXacin (LEVAQUIN) 500 mg/100 mL D5W (premix) (LEVAQUIN) 500 mg  Every 24 Hours,   Status:  Discontinued      07/02/17 0303    07/02/17 0345  methylPREDNISolone sodium succinate (SOLU-Medrol) injection 80 mg  Every 8 Hours,   Status:   Discontinued      07/02/17 0303    07/02/17 0336  Insert Indwelling Urinary Catheter  Once      07/02/17 0336    07/02/17 0336  Assess Need for Indwelling Urinary Catheter - Follow Removal Protocol  Continuous     Comments:  Indwelling Urinary Catheter Removal Criteria  Discontinue Indwelling Urinary Catheter Unless One of the Following is Present  Urinary Retention or Obstruction  Chronic Manning Catheter Use  End of Life  Critical Illness with Strict I/O   Tract or Abdominal Surgery  Stage 3/4 Sacral / Perineal Wound  Required Activity Restriction: Trauma  Required Activity Restriction: Spine Surgery  If Patient is Being Followed by Urology Contact Them PRIOR to Removal  Do Not Remove Indwelling Urinary Catheter Order is Present with a CLINICAL REASON to Maintain the Catheter. Provider is Required to Include a Clinical Reason to Maintain a Urinary Catheter    Chronic Manning Catheter Use (Present on Admission)  Assess for Continued Need & Document Medical Necessity  If Infection is Suspected, Contact the Provider        07/02/17 0336    07/02/17 0336  Catheter Care  Every Shift      07/02/17 0336    07/02/17 0329  ECG 12 Lead  STAT      07/02/17 0328    07/02/17 0325  Inpatient Consult to Case Management   Once     Provider:  (Not yet assigned)    07/02/17 0328    07/02/17 0317  Blood Gas, Arterial  Once      07/02/17 0316    07/02/17 0305  BNP  STAT      07/02/17 0304    07/02/17 0305  Adult Transthoracic Echo Complete  Once,   Status:  Canceled      07/02/17 0304    07/02/17 0304  Comprehensive Metabolic Panel  STAT      07/02/17 0304    07/02/17 0304  CBC & Differential  STAT      07/02/17 0304    07/02/17 0304  Protime-INR  STAT      07/02/17 0304    07/02/17 0304  aPTT  STAT      07/02/17 0304    07/02/17 0304  Troponin  STAT      07/02/17 0304    07/02/17 0304  CBC Auto Differential  PROCEDURE ONCE      07/02/17 0304    07/02/17 0303  ondansetron (ZOFRAN) injection 4 mg  Every 6 Hours PRN       07/02/17 0303    07/02/17 0303  Daily Weights  Daily      07/02/17 0302    07/02/17 0302  Cardiac Monitoring  Continuous      07/02/17 0302    07/02/17 0302  Continuous Pulse Oximetry  Continuous      07/02/17 0302    07/02/17 0302  Strict Bed Rest  Until Discontinued      07/02/17 0302    07/02/17 0302  Use Mobility Guidelines for Advancement of Activity  Continuous      07/02/17 0302    07/02/17 0302  Height & Weight  Once      07/02/17 0302    07/02/17 0302  Insert Peripheral IV  Once      07/02/17 0302    07/02/17 0302  Saline Lock & Maintain IV Access  Continuous      07/02/17 0302    07/02/17 0302  Inpatient Admission  Once      07/02/17 0302    07/02/17 0302  Full Code  Continuous,   Status:  Canceled      07/02/17 0302    07/02/17 0302  VTE Risk Assessment - Moderate Risk  Once      07/02/17 0302    07/02/17 0302  Mechanical VTE Prophylaxis Not Indicated: Moderate VTE Risk With Pharmacologic Prophylaxis  Once      07/02/17 0302    07/02/17 0302  NPO Diet  Diet Effective Now,   Status:  Canceled      07/02/17 0302    07/02/17 0301  sodium chloride 0.9 % flush 1-10 mL  As Needed      07/02/17 0302    07/02/17 0258  Blood Gas, Arterial  Daily,   Status:  Canceled      07/02/17 0257    07/02/17 0257  ipratropium-albuterol (DUO-NEB) nebulizer solution 3 mL  Every 4 Hours PRN,   Status:  Discontinued      07/02/17 0257 07/02/17 0257  Ventilator - AC/VC+; (14); 100; 92%; 5; 450  Continuous,   Status:  Canceled     Comments:  RT to manage    07/02/17 0256    07/02/17 0251  XR Chest 1 View  1 Time Imaging      07/02/17 0251    Unscheduled  Blood Gas, Arterial With Co-Ox  As Needed      07/03/17 0827    --  levothyroxine (SYNTHROID, LEVOTHROID) 150 MCG tablet  Every Morning Before Breakfast      07/02/17 0347    --  acetaminophen (TYLENOL) 325 MG tablet  Every 4 Hours PRN      07/02/17 0347    --  rizatriptan MLT (MAXALT-MLT) 5 MG disintegrating tablet  Once As Needed      07/02/17 0347    --  ondansetron (ZOFRAN)  "4 MG tablet  Every 8 Hours PRN      07/02/17 0347    --  promethazine (PHENERGAN) 25 MG tablet  Every 6 Hours PRN      07/02/17 0347    --  apixaban (ELIQUIS) 5 MG tablet tablet  2 Times Daily      07/02/17 0347    --  sotalol (BETAPACE) 80 MG tablet  2 Times Daily      07/02/17 0347    --  diazePAM (VALIUM) 10 MG tablet  Every 4 Hours      07/02/17 0347    --  HYDROcodone-acetaminophen (NORCO)  MG per tablet  Every 4 Hours      07/02/17 0347    --  ipratropium-albuterol (DUO-NEB) 0.5-2.5 mg/mL nebulizer  Every 6 Hours      07/02/17 0347    --  O2 (OXYGEN)  Continuous PRN      07/02/17 0347    --  SCANNED - TELEMETRY        07/02/17 0000             Physician Progress Notes (last 72 hours) (Notes from 6/30/2017  9:58 AM through 7/3/2017  9:58 AM)      LICO Minaya at 7/3/2017  8:06 AM  Version 2 of 2         [facility] Pulmonary Progress Note    Patient: Melanie Stovall  1950   MR# [id number]   Acct# [id number]  07/03/17   8:06 AM  Referring Provider: Fadi Pabon DO      Problem list:   Patient Active Problem List   Diagnosis   • Acute respiratory failure      Chief Complaint: COPD exacerbation, hypercapnia    Interval history: Respiratory acidosis on this mornings ABG's. She's awake but lethargic stating \"I never want that tube again\". RN reports that the  has been consulted to assist patient with living will, in the meantime we will make her a DNI per her wishes. She has apparently had some type of positive pressure airway device at home in the past but was intolerant of the mask. She said she will try the BiPAP this morning but she's not sure she will \"handle it\". O2 sat is 100% on 2 liters NC O2. She denies shortness of air or pain. No other aggravating, alleviating factors or associated symptoms.    HPI    Allergy:   Allergies   Allergen Reactions   • Morphine And Related Anaphylaxis   • Ativan [Lorazepam]        Meds:      apixaban 5 mg Oral BID   diazePAM 10 mg Oral Q8H "   doxycycline 100 mg Oral Q12H   famotidine 20 mg Oral BID   guaiFENesin 1,200 mg Oral Q12H   ipratropium-albuterol 3 mL Nebulization Q4H - RT   levothyroxine 150 mcg Oral Q AM   methylPREDNISolone sodium succinate 40 mg Intravenous Q6H   nicotine 1 patch Transdermal Q24H   sotalol 80 mg Oral BID        Review of Systems  Constitutional: Negative for chills and fever.   HENT: Negative for congestion and sinus pressure.   Respiratory: Positive for shortness of breath (improving) and wheezing. Negative for cough and stridor.   Cardiovascular: Negative for chest pain.   Gastrointestinal: Negative for vomiting.   Neurological: Negative for headaches.   Psychiatric/Behavioral: Negative for agitation.   All other systems reviewed and are negative.    Physical Exam:  Vitals:    07/03/17 0800   BP:    Pulse: 60   Resp:    Temp:    SpO2: 98%       Intake/Output Summary (Last 24 hours) at 07/03/17 0806  Last data filed at 07/03/17 0600   Gross per 24 hour   Intake              500 ml   Output             1350 ml   Net             -850 ml     Physical Exam  Constitutional: She appears well-developed. Non-toxic appearance. She appears chronically ill. No distress. Nasal cannula in place.   HENT:   Head: Normocephalic and atraumatic.   Nose: Nose normal.   Eyes: EOM are normal. No scleral icterus.   Neck: No JVD present. No tracheal deviation present.   Cardiovascular: Normal rate and regular rhythm.   Pulmonary/Chest: Effort normal. No respiratory distress. She has no wheezes. She has no rales.   Abdominal: Soft. There is no tenderness. There is no guarding.   Musculoskeletal: She exhibits no edema.   Neurological: She is alert but somewhat lethargic.   Skin: Skin is warm and dry. She is not diaphoretic.   Psychiatric: She has a normal mood and affect. Her behavior is normal.     Data:     Results from last 7 days  Lab Units 07/03/17  0201 07/02/17  0346   WBC 10*3/mm3 3.41* 11.11*   HEMOGLOBIN g/dL 11.7* 13.3   PLATELETS  10*3/mm3 206 196         Results from last 7 days  Lab Units 07/03/17  0201 07/02/17  0346   SODIUM mmol/L 139 141   POTASSIUM mmol/L 4.8 4.9   BUN mg/dL 15 12   CREATININE mg/dL 0.55 0.63         Results from last 7 days  Lab Units 07/03/17  0640 07/02/17  0730 07/02/17  0409 07/02/17  0311   PH, ARTERIAL pH units 7.299* 7.367 7.242* 7.179*   PCO2, ARTERIAL mm Hg 68.3* 46.1* 54.6* 73.5*   PO2 ART mm Hg 89.4 88.8 84.4 571.9*   FIO2 %  --  30 30 100     Radiology:  Imaging Results (last 24 hours)     Procedure Component Value Units Date/Time    XR Chest 1 View [[id number]] Collected:  07/02/17 0840     Updated:  07/02/17 0844    Narrative:       HISTORY: Endotracheal tube placement     FINDINGS: Portable supine radiograph of the chest reveals the patient  has been intubated with the endotracheal tube well positioned. There are  emphysematous changes of the lungs with hyperinflation of the lung  parenchyma. There is no evidence of acute lobar pneumonia or effusion.  There is mild gastric distention.       Impression:       1.. Patient intubated with endotracheal tube well-positioned.  2. Emphysematous changes of the lungs.  This report was finalized on 07/02/2017 08:40 by Dr. True Verdin MD.        Pulmonary Assessment:  1. COPD, acute exacerbation  2. Tobacco abuse  3. Respiratory acidosis  4.  Acute on chronic hypercapnic respiratory failure     Plan:   · Orders placed to make her a DNI per her wishes  · Initiate BiPAP this morning  · Follow-up ABGs  · Decreased daily Valium from 10 mg 3 times a day to 5 mg 3 times a day  · Discussed and recommended total smoking cessation to continue after discharge  · Continue in ICU until respiratory acidosis improves    Electronically signed by LICO Minaya, 07/03/17, 8:06 AM          Electronically signed by LICO Minaya at 7/3/2017  8:36 AM      LICO Minaya at 7/3/2017  8:06 AM  Version 1 of 2         [facility] Pulmonary  "Progress Note    Patient: Melanie Stovall  1950   MR# [id number]   Acct# [id number]  07/03/17   8:06 AM  Referring Provider: Fadi Pabon DO      Problem list:   Patient Active Problem List   Diagnosis   • Acute respiratory failure      Chief Complaint: COPD exacerbation, hypercapnia    Interval history: Respiratory acidosis on this mornings ABG's. She's awake but lethargic stating \"I never want that tube again\". RN reports that the  has been consulted to assist patient with living will, in the meantime we will make her a DNI per her wishes. She has apparently had some type of positive pressure airway device at home in the past but was intolerant of the mask. She said she will try the BiPAP this morning but she's not sure she will \"handle it\". O2 sat is 100% on 2 liters NC O2. She denies shortness of air or pain. No other aggravating, alleviating factors or associated symptoms.    HPI    Allergy:   Allergies   Allergen Reactions   • Morphine And Related Anaphylaxis   • Ativan [Lorazepam]        Meds:      apixaban 5 mg Oral BID   diazePAM 10 mg Oral Q8H   doxycycline 100 mg Oral Q12H   famotidine 20 mg Oral BID   guaiFENesin 1,200 mg Oral Q12H   ipratropium-albuterol 3 mL Nebulization Q4H - RT   levothyroxine 150 mcg Oral Q AM   methylPREDNISolone sodium succinate 40 mg Intravenous Q6H   nicotine 1 patch Transdermal Q24H   sotalol 80 mg Oral BID        Review of Systems  Constitutional: Negative for chills and fever.   HENT: Negative for congestion and sinus pressure.   Respiratory: Positive for shortness of breath (improving) and wheezing. Negative for cough and stridor.   Cardiovascular: Negative for chest pain.   Gastrointestinal: Negative for vomiting.   Neurological: Negative for headaches.   Psychiatric/Behavioral: Negative for agitation.   All other systems reviewed and are negative.    Physical Exam:  Vitals:    07/03/17 0800   BP:    Pulse: 60   Resp:    Temp:    SpO2: 98%       Intake/Output " Summary (Last 24 hours) at 07/03/17 0806  Last data filed at 07/03/17 0600   Gross per 24 hour   Intake              500 ml   Output             1350 ml   Net             -850 ml     Physical Exam  Constitutional: She appears well-developed. Non-toxic appearance. She appears chronically ill. No distress. Nasal cannula in place.   HENT:   Head: Normocephalic and atraumatic.   Nose: Nose normal.   Eyes: EOM are normal. No scleral icterus.   Neck: No JVD present. No tracheal deviation present.   Cardiovascular: Normal rate and regular rhythm.   Pulmonary/Chest: Effort normal. No respiratory distress. She has no wheezes. She has no rales.   Abdominal: Soft. There is no tenderness. There is no guarding.   Musculoskeletal: She exhibits no edema.   Neurological: She is alert but somewhat lethargic.   Skin: Skin is warm and dry. She is not diaphoretic.   Psychiatric: She has a normal mood and affect. Her behavior is normal.     Data:     Results from last 7 days  Lab Units 07/03/17  0201 07/02/17  0346   WBC 10*3/mm3 3.41* 11.11*   HEMOGLOBIN g/dL 11.7* 13.3   PLATELETS 10*3/mm3 206 196         Results from last 7 days  Lab Units 07/03/17  0201 07/02/17  0346   SODIUM mmol/L 139 141   POTASSIUM mmol/L 4.8 4.9   BUN mg/dL 15 12   CREATININE mg/dL 0.55 0.63         Results from last 7 days  Lab Units 07/03/17  0640 07/02/17  0730 07/02/17  0409 07/02/17  0311   PH, ARTERIAL pH units 7.299* 7.367 7.242* 7.179*   PCO2, ARTERIAL mm Hg 68.3* 46.1* 54.6* 73.5*   PO2 ART mm Hg 89.4 88.8 84.4 571.9*   FIO2 %  --  30 30 100     Radiology:  Imaging Results (last 24 hours)     Procedure Component Value Units Date/Time    XR Chest 1 View [[id number]] Collected:  07/02/17 0840     Updated:  07/02/17 0844    Narrative:       HISTORY: Endotracheal tube placement     FINDINGS: Portable supine radiograph of the chest reveals the patient  has been intubated with the endotracheal tube well positioned. There are  emphysematous changes of the  lungs with hyperinflation of the lung  parenchyma. There is no evidence of acute lobar pneumonia or effusion.  There is mild gastric distention.       Impression:       1.. Patient intubated with endotracheal tube well-positioned.  2. Emphysematous changes of the lungs.  This report was finalized on 07/02/2017 08:40 by Dr. True Verdin MD.        Pulmonary Assessment:  1. COPD, acute exacerbation  2. Tobacco abuse  3. Respiratory acidosis  4.  Acute on chronic hypercapnic respiratory failure     Plan:   · Initiate BiPAP this morning  · Follow-up ABGs  · Decreased daily Valium from 10 mg 3 times a day to 5 mg 3 times a day  · Discussed and recommended total smoking cessation to continue after discharge  · Continue in ICU until respiratory acidosis improves    Electronically signed by LICO Minaya, 07/03/17, 8:06 AM          Electronically signed by LICO Minaya at 7/3/2017  8:28 AM      Fadi Pabon DO at 7/3/2017  8:36 AM  Version 1 of 1             [facility] Medicine Services  INPATIENT PROGRESS NOTE    Length of Stay: 1  Date of Admission: 7/2/2017  Primary Care Physician: Fadi Daniels MD    Subjective   Chief Complaint: shortness of breath   HPI   She has generally felt better overnight.  However, she started to retain a little bit more carbon dioxide this morning was placed on BiPAP for the pulmonary service.  She wants to come off of BiPAP at this point in eating breakfast.  She is hungry.    She complained to me yesterday that her eyes hurt.  The left was worse than the right.  I placed her on some lubricating eyedrops and she says this issue is much better.  I wonder if she did not sustain a corneal abrasion when she was intubated at the [facility].  If she continues to have problems I can fluoroscein it.     Review of Systems   All pertinent negatives and positives are as above. All other systems have been reviewed and  are negative unless otherwise stated.     Objective    Temp:  [99.3 °F (37.4 °C)-99.8 °F (37.7 °C)] 99.3 °F (37.4 °C)  Heart Rate:  [50-64] 60  Resp:  [17-25] 22  BP: ()/(40-96) 141/68  FiO2 (%):  [30 %] 30 %  Physical Exam   Constitutional: She is oriented to person, place, and time. She appears well-developed and well-nourished.   Up in bed.  No distress.  Tolerating BiPAP.  Underweight, chronically ill-appearing.  Seen and discussed with her nurse, Ifeoma.   HENT:   Head: Normocephalic and atraumatic.   Eyes: Conjunctivae and EOM are normal. Pupils are equal, round, and reactive to light.   Neck: Neck supple. No JVD present.   Cardiovascular: Normal rate, regular rhythm, normal heart sounds and intact distal pulses.  Exam reveals no gallop and no friction rub.    No murmur heard.  Pulmonary/Chest: Effort normal. No respiratory distress. She has wheezes. She has no rales. She exhibits no tenderness.   Diminished breath sounds with some slight expiratory wheezing in the bases.   Abdominal: Soft. Bowel sounds are normal. She exhibits no distension. There is no tenderness. There is no rebound and no guarding.   Musculoskeletal: Normal range of motion. She exhibits no edema, tenderness or deformity.   Neurological: She is alert and oriented to person, place, and time. She displays normal reflexes. No cranial nerve deficit. She exhibits normal muscle tone.   Skin: Skin is warm and dry. No rash noted.   Psychiatric:   Flat, but not currently anxious.     Results Review:  I have reviewed the labs, radiology results, and diagnostic studies.    Laboratory Data:     Results from last 7 days  Lab Units 07/03/17  0201 07/02/17  0346   WBC 10*3/mm3 3.41* 11.11*   HEMOGLOBIN g/dL 11.7* 13.3   HEMATOCRIT % 37.4 45.4   PLATELETS 10*3/mm3 206 196       Results from last 7 days  Lab Units 07/03/17  0201 07/02/17  0346   SODIUM mmol/L 139 141   POTASSIUM mmol/L 4.8 4.9   CHLORIDE mmol/L 102 103   CO2 mmol/L 32.0* 28.0   BUN  mg/dL 15 12   CREATININE mg/dL 0.55 0.63   CALCIUM mg/dL 9.5 9.2   BILIRUBIN mg/dL  --  0.6   ALK PHOS U/L  --  81   ALT (SGPT) U/L  --  82*   AST (SGOT) U/L  --  98*   GLUCOSE mg/dL 128* 125*       Results from last 7 days  Lab Units 07/03/17  0640   PH, ARTERIAL pH units 7.299*   PO2 ART mm Hg 89.4   PCO2, ARTERIAL mm Hg 68.3*   HCO3 ART mmol/L 32.8*     I have reviewed the patient current medications.     Assessment/Plan   Assessment:   1.  Acute on chronic hypoxic and hypercarbic respiratory failure.  2.  Acute exacerbation of chronic obstructive pulmonary disease.  3.  Ongoing tobacco abuse.  4.  Severe generalized anxiety disorder.  5.  Paroxysmal atrial fibrillation on chronic anticoagulation with Eliquis.  6.  Hypothyroidism.  7.  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    Plan:   She required initiation of BiPAP this morning secondary to worsening respiratory acidosis on her arterial blood gas.  Hopefully, we can temporize her with this.  She reported to the pulmonary service that she wishes to be a DO NOT INTUBATE moving forward.    She apparently had a noninvasive ventilation device at home previously, but quit wearing it because she could not tolerate it.  She would be unlikely to comply with a Trilogy at discharge as well.    Continue oral doxycycline.  Intravenous steroids.  Mucinex.  Inhaled bronchodilators with Duoneb.  I would also initiate Symbicort.  Incentive spirometry when able.    Continue sotalol and Eliquis.  She currently is in sinus rhythm in the 50s on telemetry.    I would go ahead and remain in the intensive care unit today.    She often takes up to 10 mg six times per day at home chronically.  She previously was on Klonopin.  I decreased her Valium to 10 mg three times per day yesterday. The pulmonary service decreased her Valium to 5 mg 3 times daily this morning.  I also started her on Vistaril.     Her DVT prophylaxis is achieved by being on Eliquis.    Fadi WASHINGTON  DO Pepper   17   8:36 AM       Electronically signed by Fadi Pabon DO at 7/3/2017  8:53 AM           Consult Notes (last 72 hours) (Notes from 2017  9:58 AM through 7/3/2017  9:58 AM)      Pj Mello MD at 2017  2:00 PM  Version 1 of 1     Consult Orders:    1. Inpatient Consult to Pulmonology [[id number]] ordered by Fadi Pabon DO at 17 0915                    PULMONARY & CRITICAL CARE CONSULT - [facility]  17, 2:00 PM  Patient Care Team:  Fadi Daniels MD as PCP - General (Family Medicine)  John E Broadbent, MD as Cardiologist (Cardiology)  Name: Melanie Stovall   : 1950  MRN: [id number]  Contact Serial Number [id number]  Chief complaint: COPD  HPI:  We have been consulted by Fadi Pabon DO to see this 66 y.o. year old female born on 1950.  Patient complains of dyspnea in the chest for 1 day. Severity: profound and improving.  Aggravating factors: none.   Alleviating factors: medication(s) (intubation and mechanical ventilation) Associated symptoms: wheezing. Sputum is scant.  Patient currently is on oxygen at unknown L/min per nasal cannula.. Respiratory history: chronic bronchitis, COPD and tobacco abuse which continues  She went to [facility] with these symptoms yesterday and ended up intubated.  She was transferred here where she was extubated.  She reports improvement in the shortness of breath.  Recent med changes include sotolol and eliquis started.  Past Medical History:  Past Medical History:   Diagnosis Date   • A-fib 2017   • Arthritis    • COPD (chronic obstructive pulmonary disease)    • Disease of thyroid gland    • Hypertension    • Kidney donor     left kidney removed     Past Surgical History:   Procedure Laterality Date   • APPENDECTOMY     • BACK SURGERY     • HYSTERECTOMY     • NEPHRECTOMY Right    • TONSILLECTOMY       Allergies   Allergen Reactions   • Morphine And Related Anaphylaxis   • Ativan  [Lorazepam]      Medications:    apixaban 5 mg Oral BID   diazePAM 10 mg Oral Q8H   doxycycline 100 mg Oral Q12H   famotidine 20 mg Intravenous BID   guaiFENesin 1,200 mg Oral Q12H   ipratropium-albuterol 3 mL Nebulization Q4H - RT   levothyroxine 150 mcg Oral Q AM   methylPREDNISolone sodium succinate 40 mg Intravenous Q6H   nicotine 1 patch Transdermal Q24H   sotalol 80 mg Oral BID        Family History:   brother  from diabetes.  RA, lung and heart disease.  Social History:   reports that she has been smoking Cigarettes.  She has a 45.00 pack-year smoking history. She has never used smokeless tobacco. She reports that she does not drink alcohol or use illicit drugs.     Review of Systems:  Review of Systems   Constitutional: Negative for chills and fever.   HENT: Negative for congestion and sinus pressure.    Respiratory: Positive for shortness of breath and wheezing. Negative for cough and stridor.    Cardiovascular: Negative for chest pain.   Gastrointestinal: Negative for vomiting.   Neurological: Negative for headaches.   Psychiatric/Behavioral: Negative for agitation.   All other systems reviewed and are negative.     Physical Exam:  Temp:  [98.9 °F (37.2 °C)] 98.9 °F (37.2 °C)  Heart Rate:  [62-77] 62  Resp:  [18-22] 22  BP: ()/() 130/79  FiO2 (%):  [30 %-100 %] 30 %  Intake/Output Summary (Last 24 hours) at 17 1400  Last data filed at 17 1100   Gross per 24 hour   Intake              250 ml   Output              350 ml   Net             -100 ml     Last 3 weights    17  0314   Weight: 81 lb 12.8 oz (37.1 kg)     SpO2 Readings from Last 3 Encounters:   17 100%     Physical Exam   Constitutional: She appears well-developed. She is active.  Non-toxic appearance. She does not have a sickly appearance. She appears ill. No distress. Nasal cannula in place.   HENT:   Head: Normocephalic and atraumatic.   Right Ear: External ear normal.   Left Ear: External ear normal.    Nose: Nose normal.   Eyes: EOM are normal. No scleral icterus.   Neck: No JVD present. No tracheal deviation present.   Cardiovascular: Normal rate and regular rhythm.    Pulmonary/Chest: Effort normal. No respiratory distress. She has wheezes. She has no rales.   Abdominal: Soft. There is no tenderness. There is no guarding.   Musculoskeletal: She exhibits no edema.   Neurological: She is alert.   Skin: Skin is warm and dry. She is not diaphoretic.   Psychiatric: She has a normal mood and affect. Her behavior is normal.       Results from last 7 days  Lab Units 07/02/17  0346   WBC 10*3/mm3 11.11*   HEMOGLOBIN g/dL 13.3   PLATELETS 10*3/mm3 196       Results from last 7 days  Lab Units 07/02/17  0346   SODIUM mmol/L 141   POTASSIUM mmol/L 4.9   CO2 mmol/L 28.0   BUN mg/dL 12   CREATININE mg/dL 0.63   GLUCOSE mg/dL 125*       Results from last 7 days  Lab Units 07/02/17  0730 07/02/17  0409 07/02/17  0311   PH, ARTERIAL pH units 7.367 7.242* 7.179*   PCO2, ARTERIAL mm Hg 46.1* 54.6* 73.5*   PO2 ART mm Hg 88.8 84.4 571.9*   FIO2 % 30 30 100     Lab Results   Component Value Date    PROBNP 256.0 07/02/2017      Troponin I 0.144 (H) ng/mL  Total Protein 6.5 g/dL     Albumin 3.90 g/dL     ALT (SGPT) 82 (H) U/L     AST (SGOT) 98 (H) U/L     Alkaline Phosphatase 81 U/L     Total Bilirubin 0.6 mg/dL  RBC, UA 0-2 (A) /HPF     WBC, UA 0-2 (A) /HPF     Bacteria, UA None Seen /HPF     Squamous Epithelial Cells, UA 0-2 /HPF     Hyaline Casts, UA None Seen /LPF     Methodology Manual Light Microscopy  Color, UA Yellow     Appearance, UA Clear     pH, UA 6.5     Specific Gravity, UA 1.013     Glucose, UA Negative     Ketones, UA Trace (A)     Bilirubin, UA Negative     Blood, UA Trace (A)     Protein, UA 30 mg/dL (1+) (A)     Leuk Esterase, UA Negative     Nitrite, UA Negative     Urobilinogen, UA 1.0 E.U./dL    Recent radiology:   Imaging Results (last 72 hours)     Procedure Component Value Units Date/Time    XR Chest 1 View  [[id number]] Collected:  17 0840     Updated:  17 0844    Narrative:       HISTORY: Endotracheal tube placement  FINDINGS: Portable supine radiograph of the chest reveals the patient  has been intubated with the endotracheal tube well positioned. There are  emphysematous changes of the lungs with hyperinflation of the lung  parenchyma. There is no evidence of acute lobar pneumonia or effusion.  There is mild gastric distention.    Impression:       1.. Patient intubated with endotracheal tube well-positioned.  2. Emphysematous changes of the lungs.  This report was finalized on 2017 08:40 by Dr. True Verdin MD.        My radiograph interpretation/independent review of imaging: severe copd changes. ett in place  Independent review of ekg: telemetry strip: sinus rhythm  Patient Active Problem List   Diagnosis   • Acute respiratory failure     Pulmonary Assessment:  New problem (to me), with additional workup planned: copd acute exacerbation, apparently improved  New problem (to me), no additional workup planned: paroxysmal atrial fibrillation  Other problems either stable, failing to improve or worsenin. Tobacco abuse.  Needs to stop smoking  2. Respiratory acidosis, better    Recommend/plan:   · Taper steroids  · Outpatient pft once back to baseline  · abg off vent  · Stop smoking  · Keep in icu    Electronically signed by Pj Mello MD, 17, 2:00 PM             Electronically signed by Pj Mello MD at 2017  2:28 PM

## 2017-07-03 NOTE — PLAN OF CARE
Problem: Fall Risk (Adult)  Goal: Absence of Falls  Outcome: Ongoing (interventions implemented as appropriate)    Problem: COPD, Chronic Bronchitis/Emphysema (Adult)  Goal: Signs and Symptoms of Listed Potential Problems Will be Absent or Manageable (COPD, Chronic Bronchitis/Emphysema)  Outcome: Ongoing (interventions implemented as appropriate)    Problem: Anxiety (Adult)  Goal: Identify Related Risk Factors and Signs and Symptoms  Outcome: Ongoing (interventions implemented as appropriate)  Goal: Reduction/Resolution  Outcome: Ongoing (interventions implemented as appropriate)    Problem: Pressure Ulcer Risk (Timothy Scale) (Adult,Obstetrics,Pediatric)  Goal: Identify Related Risk Factors and Signs and Symptoms  Outcome: Ongoing (interventions implemented as appropriate)  Goal: Skin Integrity  Outcome: Ongoing (interventions implemented as appropriate)

## 2017-07-03 NOTE — PROGRESS NOTES
[facility] Medicine Services  INPATIENT PROGRESS NOTE    Length of Stay: 1  Date of Admission: 7/2/2017  Primary Care Physician: Fadi Daniels MD    Subjective   Chief Complaint: shortness of breath   HPI   She has generally felt better overnight.  However, she started to retain a little bit more carbon dioxide this morning was placed on BiPAP for the pulmonary service.  She wants to come off of BiPAP at this point in eating breakfast.  She is hungry.    She complained to me yesterday that her eyes hurt.  The left was worse than the right.  I placed her on some lubricating eyedrops and she says this issue is much better.  I wonder if she did not sustain a corneal abrasion when she was intubated at the [facility] hospital.  If she continues to have problems I can fluoroscein it.     Review of Systems   All pertinent negatives and positives are as above. All other systems have been reviewed and are negative unless otherwise stated.     Objective    Temp:  [99.3 °F (37.4 °C)-99.8 °F (37.7 °C)] 99.3 °F (37.4 °C)  Heart Rate:  [50-64] 60  Resp:  [17-25] 22  BP: ()/(40-96) 141/68  FiO2 (%):  [30 %] 30 %  Physical Exam   Constitutional: She is oriented to person, place, and time. She appears well-developed and well-nourished.   Up in bed.  No distress.  Tolerating BiPAP.  Underweight, chronically ill-appearing.  Seen and discussed with her nurse, Ifeoma.   HENT:   Head: Normocephalic and atraumatic.   Eyes: Conjunctivae and EOM are normal. Pupils are equal, round, and reactive to light.   Neck: Neck supple. No JVD present.   Cardiovascular: Normal rate, regular rhythm, normal heart sounds and intact distal pulses.  Exam reveals no gallop and no friction rub.    No murmur heard.  Pulmonary/Chest: Effort normal. No respiratory distress. She has wheezes. She has no rales. She exhibits no tenderness.   Diminished breath sounds with some slight expiratory wheezing in the bases.    Abdominal: Soft. Bowel sounds are normal. She exhibits no distension. There is no tenderness. There is no rebound and no guarding.   Musculoskeletal: Normal range of motion. She exhibits no edema, tenderness or deformity.   Neurological: She is alert and oriented to person, place, and time. She displays normal reflexes. No cranial nerve deficit. She exhibits normal muscle tone.   Skin: Skin is warm and dry. No rash noted.   Psychiatric:   Flat, but not currently anxious.     Results Review:  I have reviewed the labs, radiology results, and diagnostic studies.    Laboratory Data:     Results from last 7 days  Lab Units 07/03/17  0201 07/02/17  0346   WBC 10*3/mm3 3.41* 11.11*   HEMOGLOBIN g/dL 11.7* 13.3   HEMATOCRIT % 37.4 45.4   PLATELETS 10*3/mm3 206 196       Results from last 7 days  Lab Units 07/03/17  0201 07/02/17  0346   SODIUM mmol/L 139 141   POTASSIUM mmol/L 4.8 4.9   CHLORIDE mmol/L 102 103   CO2 mmol/L 32.0* 28.0   BUN mg/dL 15 12   CREATININE mg/dL 0.55 0.63   CALCIUM mg/dL 9.5 9.2   BILIRUBIN mg/dL  --  0.6   ALK PHOS U/L  --  81   ALT (SGPT) U/L  --  82*   AST (SGOT) U/L  --  98*   GLUCOSE mg/dL 128* 125*       Results from last 7 days  Lab Units 07/03/17  0640   PH, ARTERIAL pH units 7.299*   PO2 ART mm Hg 89.4   PCO2, ARTERIAL mm Hg 68.3*   HCO3 ART mmol/L 32.8*     I have reviewed the patient current medications.     Assessment/Plan   Assessment:   1.  Acute on chronic hypoxic and hypercarbic respiratory failure.  2.  Acute exacerbation of chronic obstructive pulmonary disease.  3.  Ongoing tobacco abuse.  4.  Severe generalized anxiety disorder.  5.  Paroxysmal atrial fibrillation on chronic anticoagulation with Eliquis.  6.  Hypothyroidism.  7.  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    Plan:   She required initiation of BiPAP this morning secondary to worsening respiratory acidosis on her arterial blood gas.  Hopefully, we can temporize her with this.  She reported to the  pulmonary service that she wishes to be a DO NOT INTUBATE moving forward.    She apparently had a noninvasive ventilation device at home previously, but quit wearing it because she could not tolerate it.  She would be unlikely to comply with a Trilogy at discharge as well.    Continue oral doxycycline.  Intravenous steroids.  Mucinex.  Inhaled bronchodilators with Duoneb.  I would also initiate Symbicort.  Incentive spirometry when able.    Continue sotalol and Eliquis.  She currently is in sinus rhythm in the 50s on telemetry.    I would go ahead and remain in the intensive care unit today.    She often takes up to 10 mg six times per day at home chronically.  She previously was on Klonopin.  I decreased her Valium to 10 mg three times per day yesterday. The pulmonary service decreased her Valium to 5 mg 3 times daily this morning.  I also started her on Vistaril.     Her DVT prophylaxis is achieved by being on Eliquis.    Fadi Pabon, DO   07/03/17   8:36 AM

## 2017-07-04 NOTE — PLAN OF CARE
Problem: Fall Risk (Adult)  Goal: Absence of Falls  Outcome: Ongoing (interventions implemented as appropriate)    Problem: COPD, Chronic Bronchitis/Emphysema (Adult)  Goal: Signs and Symptoms of Listed Potential Problems Will be Absent or Manageable (COPD, Chronic Bronchitis/Emphysema)  Outcome: Ongoing (interventions implemented as appropriate)    Problem: Anxiety (Adult)  Goal: Reduction/Resolution  Outcome: Ongoing (interventions implemented as appropriate)    Problem: Pressure Ulcer Risk (Timothy Scale) (Adult,Obstetrics,Pediatric)  Goal: Skin Integrity  Outcome: Outcome(s) achieved Date Met:  07/04/17

## 2017-07-04 NOTE — SIGNIFICANT NOTE
Pt transported to 476 per cannula @ 2lpm. Bipap on sby ....transported to 476 as well placed in room on sby, Report given to Eric RRT

## 2017-07-04 NOTE — PLAN OF CARE
Problem: Patient Care Overview (Adult)  Goal: Plan of Care Review  Outcome: Ongoing (interventions implemented as appropriate)    07/04/17 1447   Coping/Psychosocial Response Interventions   Plan Of Care Reviewed With patient   Patient Care Overview   Progress progress towards functional goals is fair   Outcome Evaluation   Outcome Summary/Follow up Plan Continue to monitor vitals and labs. No c/o of pain.         Problem: Fall Risk (Adult)  Goal: Absence of Falls  Outcome: Ongoing (interventions implemented as appropriate)    Problem: COPD, Chronic Bronchitis/Emphysema (Adult)  Goal: Signs and Symptoms of Listed Potential Problems Will be Absent or Manageable (COPD, Chronic Bronchitis/Emphysema)  Outcome: Ongoing (interventions implemented as appropriate)    Problem: Anxiety (Adult)  Goal: Reduction/Resolution  Outcome: Ongoing (interventions implemented as appropriate)

## 2017-07-04 NOTE — PROGRESS NOTES
[facility] Medicine Services  INPATIENT PROGRESS NOTE    Length of Stay: 2  Date of Admission: 7/2/2017  Primary Care Physician: Fadi Daniels MD    Subjective   Chief Complaint: shortness of breath   HPI   Off of BiPAP.  She states that she feels well this morning.  Less difficulty with breathing.  No wheezing.  She is hopeful to go to the floor today.    The vision in her left eye is just slightly blurry.  She has no photophobia.  No pain.  She has done well with lubricating eyedrops.  At this point in time we will hold off on staining and presume that she did suffer a small left corneal abrasion that is improving with conservative measures.    Review of Systems   All pertinent negatives and positives are as above. All other systems have been reviewed and are negative unless otherwise stated.     Objective    Temp:  [97.2 °F (36.2 °C)-99 °F (37.2 °C)] 97.2 °F (36.2 °C)  Heart Rate:  [46-66] 51  Resp:  [12-24] 18  BP: (105-150)/(48-95) 124/56  Physical Exam  Constitutional: She is oriented to person, place, and time. She appears well-developed and well-nourished.   Up in bed. No distress. Finishing breakfast. Off BiPAP and on cannula. Chronically ill-appearing, but looks better today. Discussed with her nurse, Ifeoma.   Head: Normocephalic and atraumatic.   Eyes: Conjunctivae and EOM are normal. Pupils are equal, round, and reactive to light.   Neck: Neck supple. No JVD present.   Cardiovascular: Normal rate, regular rhythm, normal heart sounds and intact distal pulses. Exam reveals no gallop and no friction rub. No murmur heard.  Pulmonary/Chest: Effort normal. No respiratory distress. She has wheezes. She has no rales. She exhibits no tenderness. Diminished breath sounds with some slight expiratory wheezing in the bases.   Abdominal: Soft. Bowel sounds are normal. She exhibits no distension. There is no tenderness. There is no rebound and no guarding.   Musculoskeletal:  Normal range of motion. She exhibits no edema, tenderness or deformity.   Neurological: She is alert and oriented to person, place, and time. She displays normal reflexes. No cranial nerve deficit. She exhibits normal muscle tone.   Skin: Skin is warm and dry. No rash noted.   Psychiatric: Flat, but not currently anxious.     Results Review:  I have reviewed the labs, radiology results, and diagnostic studies.    Laboratory Data:     Results from last 7 days  Lab Units 07/04/17  0242 07/03/17 0201 07/02/17  0346   WBC 10*3/mm3 4.76* 3.41* 11.11*   HEMOGLOBIN g/dL 10.6* 11.7* 13.3   HEMATOCRIT % 33.6* 37.4 45.4   PLATELETS 10*3/mm3 178 206 196       Results from last 7 days  Lab Units 07/04/17  0242 07/03/17  0928 07/03/17  0201 07/02/17  0346   SODIUM mmol/L 138  --  139 141   SODIUM, ARTERIAL mmol/L  --  135.8  --   --    POTASSIUM mmol/L 4.5  --  4.8 4.9   CHLORIDE mmol/L 100  --  102 103   CO2 mmol/L 34.0*  --  32.0* 28.0   BUN mg/dL 19  --  15 12   CREATININE mg/dL 0.48*  --  0.55 0.63   CALCIUM mg/dL 9.1  --  9.5 9.2   BILIRUBIN mg/dL  --   --   --  0.6   ALK PHOS U/L  --   --   --  81   ALT (SGPT) U/L  --   --   --  82*   AST (SGOT) U/L  --   --   --  98*   GLUCOSE mg/dL 150*  --  128* 125*       Results from last 7 days  Lab Units 07/03/17  0928   PH, ARTERIAL pH units 7.394   PO2 ART mm Hg 65.0*   PCO2, ARTERIAL mm Hg 53.4*   HCO3 ART mmol/L 31.9*     I have reviewed the patient current medications.     Assessment/Plan   Assessment:   1. Acute on chronic hypoxic and hypercarbic respiratory failure.  2. Acute exacerbation of chronic obstructive pulmonary disease.  3. Ongoing tobacco abuse.  4. Severe generalized anxiety disorder.  5. Paroxysmal atrial fibrillation on chronic anticoagulation with Eliquis.  6. Hypothyroidism.  7. 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  8. Possible left eye corneal abrasion during the course of intubation, improved.     Plan:   She required BiPAP yesterday.   She has been off of it this morning and seems to be doing well. She apparently had a noninvasive ventilation device at home previously, but quit wearing it because she could not tolerate it. She would be unlikely to comply with a Trilogy at discharge as well.     Continue oral doxycycline. Start to wean intravenous steroids. Mucinex. Inhaled bronchodilators with Duoneb. I would also initiate Symbicort. Incentive spirometry when able.     Continue sotalol and Eliquis. She currently is in sinus rhythm in the 50s on telemetry.     She often takes up to 10 mg six times per day at home chronically. She previously was on Klonopin. I decreased her Valium to 10 mg three times per day yesterday. The pulmonary service decreased her Valium to 5 mg 3 times daily this morning. I also started her on Vistaril.      Her DVT prophylaxis is achieved by being on Eliquis.    To 4C today.     She remains a DO NOT INTUBATE.    Fadi Pabon,    07/04/17   8:54 AM

## 2017-07-04 NOTE — PLAN OF CARE
Problem: Patient Care Overview (Adult)  Goal: Plan of Care Review  Outcome: Ongoing (interventions implemented as appropriate)    07/04/17 6605   Coping/Psychosocial Response Interventions   Plan Of Care Reviewed With patient   Patient Care Overview   Progress improving   Outcome Evaluation   Outcome Summary/Follow up Plan VSS. Slept well. No c/o SOA. OOB to BSC frequently without difficulty. States appetite is better.          Problem: Fall Risk (Adult)  Goal: Identify Related Risk Factors and Signs and Symptoms  Outcome: Outcome(s) achieved Date Met:  07/04/17  Goal: Absence of Falls  Outcome: Ongoing (interventions implemented as appropriate)    Problem: COPD, Chronic Bronchitis/Emphysema (Adult)  Goal: Signs and Symptoms of Listed Potential Problems Will be Absent or Manageable (COPD, Chronic Bronchitis/Emphysema)  Outcome: Ongoing (interventions implemented as appropriate)    Problem: Anxiety (Adult)  Goal: Identify Related Risk Factors and Signs and Symptoms  Outcome: Outcome(s) achieved Date Met:  07/04/17  Goal: Reduction/Resolution  Outcome: Ongoing (interventions implemented as appropriate)    Problem: Pressure Ulcer Risk (Timothy Scale) (Adult,Obstetrics,Pediatric)  Goal: Identify Related Risk Factors and Signs and Symptoms  Outcome: Outcome(s) achieved Date Met:  07/04/17  Goal: Skin Integrity  Outcome: Ongoing (interventions implemented as appropriate)

## 2017-07-05 NOTE — PROGRESS NOTES
Continued Stay Note  FRANCINE Wells     Patient Name: Melanie Stovall  MRN: [id number]  Today's Date: 7/5/2017    Admit Date: 7/2/2017          Discharge Plan       07/05/17 1004    Case Management/Social Work Plan    Plan Home    Patient/Family In Agreement With Plan yes    Additional Comments Plan is still for pt to return home with spouse at d/c. Noted that she may need a bipap at home. Will follow for testing to see if she qualifies.               Discharge Codes     None            BRAULIO Darling

## 2017-07-05 NOTE — PROGRESS NOTES
[facility] Pulmonary Progress Note    Patient: Melanie Stovall  1950   MR# [id number]   Acct# [id number]  07/05/17   8:48 AM  Referring Provider: Fadi Pabon DO      Problem list:   Patient Active Problem List   Diagnosis   • Acute respiratory failure      Chief Complaint: COPD exacerbation, hypercapnia-improved    Interval history: Patient is resting comfortably on 2L NC O2 with a saturation of 99%. She did not need bipap again last night. She has apparently had some type of positive pressure airway device at home in the past but was intolerant of the mask. She denies shortness of air or pain. No other aggravating, alleviating factors or associated symptoms.    HPI    Allergy:   Allergies   Allergen Reactions   • Morphine And Related Anaphylaxis   • Ativan [Lorazepam]        Meds:      apixaban 5 mg Oral BID   budesonide-formoterol 2 puff Inhalation BID - RT   diazePAM 5 mg Oral Q8H   doxycycline 100 mg Oral Q12H   famotidine 20 mg Oral BID   guaiFENesin 1,200 mg Oral Q12H   ipratropium-albuterol 3 mL Nebulization Q4H - RT   levothyroxine 150 mcg Oral Q AM   methylPREDNISolone sodium succinate 40 mg Intravenous Q12H   nicotine 1 patch Transdermal Q24H   sotalol 80 mg Oral BID        Review of Systems  Constitutional: Negative for chills and fever.   HENT: Negative for congestion and sinus pressure.   Respiratory: Positive for shortness of breath (improving) and wheezing. Negative for cough and stridor.   Cardiovascular: Negative for chest pain.   Gastrointestinal: Negative for vomiting.   Neurological: Negative for headaches.   Psychiatric/Behavioral: Negative for agitation.   All other systems reviewed and are negative.    Physical Exam:  Vitals:    07/05/17 0755   BP:    Pulse: 50   Resp:    Temp:    SpO2: 95%       Intake/Output Summary (Last 24 hours) at 07/05/17 0848  Last data filed at 07/05/17 0400   Gross per 24 hour   Intake              720 ml   Output              450 ml   Net              270 ml      Physical Exam  Constitutional: She appears well-developed. Non-toxic appearance. She appears chronically ill. No distress. Nasal cannula in place.   HENT:   Head: Normocephalic and atraumatic.   Nose: Nose normal.   Eyes: EOM are normal. No scleral icterus.   Neck: No JVD present. No tracheal deviation present.   Cardiovascular: Normal rate and regular rhythm.   Pulmonary/Chest: Effort normal. No respiratory distress. She has wheezes on the left. She has no rales or rhonchi.   Abdominal: Soft. There is no tenderness. There is no guarding.   Musculoskeletal: She exhibits no edema.   Neurological: She is alert but somewhat lethargic.   Skin: Skin is warm and dry. She is not diaphoretic.   Psychiatric: She has a normal mood and affect. Her behavior is normal.     Data:     Results from last 7 days  Lab Units 07/04/17  0242 07/03/17  0201 07/02/17  0346   WBC 10*3/mm3 4.76* 3.41* 11.11*   HEMOGLOBIN g/dL 10.6* 11.7* 13.3   PLATELETS 10*3/mm3 178 206 196         Results from last 7 days  Lab Units 07/04/17  0242 07/03/17  0928 07/03/17  0201 07/02/17  0346   SODIUM mmol/L 138  --  139 141   SODIUM, ARTERIAL mmol/L  --  135.8  --   --    POTASSIUM mmol/L 4.5  --  4.8 4.9   BUN mg/dL 19  --  15 12   CREATININE mg/dL 0.48*  --  0.55 0.63         Results from last 7 days  Lab Units 07/03/17  0928 07/03/17  0640 07/02/17  0730 07/02/17  0409   PH, ARTERIAL pH units 7.394 7.299* 7.367 7.242*   PCO2, ARTERIAL mm Hg 53.4* 68.3* 46.1* 54.6*   PO2 ART mm Hg 65.0* 89.4 88.8 84.4   FIO2 % 30  --  30 30     Radiology:  Imaging Results (last 24 hours)     ** No results found for the last 24 hours. **        Pulmonary Assessment:  1. COPD, acute exacerbation-better  2. Tobacco abuse  3. Respiratory acidosis-better  4.  Acute on chronic hypercapnic respiratory failure-stable    Plan:   · Increase activity as tolerated, would likely benefit from some PT  · Patient is a DNI  · Will check her before she goes home to see if she  qualifies for a bipap at home as she has had one in the past. She may not be able to tolerate it as she has had some trouble here.   · Recommend total smoking cessation to continue after discharge    Electronically signed by LICO Minaya, 07/05/17, 8:48 AM       Physician substantive contribution:  Pertinent symptoms/interval history include: she has no new complaints.  She had a cpap machine in the past, could not use it.  Says today she did ok without it overnight.    Respiratory exam shows pertinent findings of:diminished breath sounds, improved.  Plan includes: improved.  Continue copd tx, activity,  Probably hold of on bipap type equipment with anticipated noncompliance.  I have seen and examined patient personally, performing a face-to-face diagnostic evaluation with plan of care reviewed and developed with APRN and nursing staff. I have addended and/or modified the above history of present illness, physical examination, and assessment and plan to reflect my findings and impressions. Essential elements of the care plan were discussed with APRN above.  Agree with findings and assessment/plan as documented above.    Electronically signed by Pj Mello MD, on 7/5/2017, 5:43 PM

## 2017-07-05 NOTE — PLAN OF CARE
Problem: Patient Care Overview (Adult)  Goal: Plan of Care Review  Outcome: Ongoing (interventions implemented as appropriate)    07/05/17 4584   Coping/Psychosocial Response Interventions   Plan Of Care Reviewed With patient   Patient Care Overview   Progress no change   Outcome Evaluation   Outcome Summary/Follow up Plan Initial RDN assessment. Pt reports poor-fair appetite, indicates she has limited food preferences and early satiety with small meals. Pt is on a regular diet, PO intake avg 68.75%/4meals/2days. BMI 14.89, 73.6% IBW, 81% UBW, with approx 19% loss in past 6-8 months. RDN offered supplements, Pt refused. RDN educated on foods to increased kcal/protein intake and Pt refused. RDN advised of alt selections and encouraged intake. Will continue to FU.         Problem: COPD, Chronic Bronchitis/Emphysema (Adult)  Goal: Signs and Symptoms of Listed Potential Problems Will be Absent or Manageable (COPD, Chronic Bronchitis/Emphysema)  Outcome: Ongoing (interventions implemented as appropriate)

## 2017-07-05 NOTE — PLAN OF CARE
Problem: Patient Care Overview (Adult)  Goal: Plan of Care Review  Outcome: Ongoing (interventions implemented as appropriate)    07/05/17 1024   Coping/Psychosocial Response Interventions   Plan Of Care Reviewed With patient   Patient Care Overview   Progress progress toward functional goals as expected   Outcome Evaluation   Outcome Summary/Follow up Plan Continue to monitor labs and vitals. No noted changes. Will continue to monitor.         Problem: Fall Risk (Adult)  Goal: Absence of Falls  Outcome: Ongoing (interventions implemented as appropriate)    Problem: COPD, Chronic Bronchitis/Emphysema (Adult)  Goal: Signs and Symptoms of Listed Potential Problems Will be Absent or Manageable (COPD, Chronic Bronchitis/Emphysema)  Outcome: Outcome(s) achieved Date Met:  07/05/17    Problem: Anxiety (Adult)  Goal: Reduction/Resolution  Outcome: Ongoing (interventions implemented as appropriate)

## 2017-07-05 NOTE — PROGRESS NOTES
Malnutrition Severity Assessment    Patient Name:  Melanie Stovall  YOB: 1950  MRN: [id number]  Admit Date:  7/2/2017    Patient meets criteria for : Moderate malnutrition    Comments:  Pt reports poor-fair appetite, indicates she has limited food preferences and early satiety. Currently she is on a regular diet, PO intake avg 68.75%/4meals/2days. BMI 14.89, 73.6% IBW, 81% UBW, with approx 19% loss in past 6-8 months. RDN offered supplements, Pt refused. RDN educated on foods to increase kcal/protein intake and Pt refused. RDN advised of alt selections and encouraged intake.     If in agreement with malnutrition assessment, please attest documentation.  RDN will continue to FU.      Malnutrition Type: Chronic Illness Malnutrition     Malnutrition Type (last 8 hours)      Malnutrition Severity Assessment       07/05/17 1630    Physical Signs of Malnutrition (Chronic)    Muscle Wasting Mild   mild to moderate temporalis and  interosseous muscle wasting    Fat Loss Mild   thin upper extremities; mild to moderate wasting      07/05/17 1630    Malnutrition Severity Assessment    Malnutrition Type Chronic Illness Malnutrition          Physical Signs of Malnutrition         Most Recent Value    Muscle Wasting  Mild [mild to moderate temporalis and  interosseous muscle wasting]    Fat Loss  Mild [thin upper extremities,  mild to moderate wasting]      Weight Status         Most Recent Value    BMI  Severe (<16) [14.89]    %IBW  Mod <80% [73.6%]    %UBW  Mod (75-85%) [81%]    Weight Loss  Mod (20% / 1 yr)              Electronically signed by:  Alka Long RDN, PEPITO  07/05/17 4:40 PM

## 2017-07-05 NOTE — PLAN OF CARE
Problem: Patient Care Overview (Adult)  Goal: Plan of Care Review  Outcome: Ongoing (interventions implemented as appropriate)    07/05/17 0521   Coping/Psychosocial Response Interventions   Plan Of Care Reviewed With patient   Patient Care Overview   Progress improving   Outcome Evaluation   Outcome Summary/Follow up Plan No c/o pain voiced this shift. Up w/1 assist. Did take prn to help w/sleep. Heart rate does drop to 46- 48 at times.          Problem: Fall Risk (Adult)  Goal: Absence of Falls  Outcome: Ongoing (interventions implemented as appropriate)    Problem: COPD, Chronic Bronchitis/Emphysema (Adult)  Goal: Signs and Symptoms of Listed Potential Problems Will be Absent or Manageable (COPD, Chronic Bronchitis/Emphysema)  Outcome: Ongoing (interventions implemented as appropriate)    Problem: Anxiety (Adult)  Goal: Reduction/Resolution  Outcome: Ongoing (interventions implemented as appropriate)

## 2017-07-05 NOTE — PROGRESS NOTES
[facility] Medicine Services  INPATIENT PROGRESS NOTE    Length of Stay: 3  Date of Admission: 7/2/2017  Primary Care Physician: Fadi Daniels MD    Subjective   Chief Complaint: shortness of breath   HPI   She did not wear BiPAP last night, however, she did well without it.  She has back on her home 2 L nasal cannula.  She plans to become more active today.  No cough or wheezing.    She hopes to go home tomorrow.    Review of Systems   All pertinent negatives and positives are as above. All other systems have been reviewed and are negative unless otherwise stated.     Objective    Temp:  [97 °F (36.1 °C)-98.7 °F (37.1 °C)] 97.3 °F (36.3 °C)  Heart Rate:  [47-55] 50  Resp:  [16-21] 18  BP: (105-152)/(42-67) 133/57  Physical Exam  Constitutional: She is oriented to person, place, and time. She appears well-developed and well-nourished.   Up in bed. No distress. On 2 L nasal cannula. Chronically ill-appearing, but looks better again today. Discussed with her nurse, Brenda.   Head: Normocephalic and atraumatic.   Eyes: Conjunctivae and EOM are normal. Pupils are equal, round, and reactive to light.   Neck: Neck supple. No JVD present.   Cardiovascular: Normal rate, regular rhythm, normal heart sounds and intact distal pulses. Exam reveals no gallop and no friction rub. No murmur heard.  Pulmonary/Chest: Effort normal. No respiratory distress. She has wheezes. She has no rales. She exhibits no tenderness. Diminished breath sounds with some slight expiratory wheezing in the bases.   Abdominal: Soft. Bowel sounds are normal. She exhibits no distension. There is no tenderness. There is no rebound and no guarding.   Musculoskeletal: Normal range of motion. She exhibits no edema, tenderness or deformity.   Neurological: She is alert and oriented to person, place, and time. She displays normal reflexes. No cranial nerve deficit. She exhibits normal muscle tone.   Skin: Skin is warm  and dry. No rash noted.   Psychiatric: Flat, but not currently anxious.     Results Review:  I have reviewed the labs, radiology results, and diagnostic studies.    Laboratory Data:   No new labs needed today.     I have reviewed the patient current medications.     Assessment/Plan   Assessment:   1. Acute on chronic hypoxic and hypercarbic respiratory failure.  2. Acute exacerbation of chronic obstructive pulmonary disease.  3. Ongoing tobacco abuse.  4. Severe generalized anxiety disorder.  5. Paroxysmal atrial fibrillation on chronic anticoagulation with Eliquis.  6. Hypothyroidism.  7. 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  8. Possible left eye corneal abrasion during the course of intubation, improved.      Plan:   She required BiPAP recently, but did not require it last night or yesterday. She apparently had a noninvasive ventilation device at home previously, but quit wearing it because she could not tolerate it. She would be unlikely to comply with a Trilogy at discharge as well.  Pulmonology's note seems to indicate that they will look into this again prior to discharge.      Continue oral doxycycline. Weaning intravenous steroids. Mucinex. Inhaled bronchodilators with Duoneb. Initiated Symbicort on 7/3. Incentive spirometry.       Continue sotalol and Eliquis. She currently is in sinus rhythm in the 50s on telemetry.      She often takes up to 10 mg six times per day at home chronically. She previously was on Klonopin. I decreased her Valium to 10 mg three times per day yesterday. The pulmonary service decreased her Valium to 5 mg 3 times daily this morning. I also started her on Vistaril.       Her DVT prophylaxis is achieved by being on Eliquis.     She remains a DO NOT INTUBATE.    Discharge Planning: I expect patient to be discharged to home in 1-2 days.    Fadi Pabon,    07/05/17   10:11 AM

## 2017-07-06 NOTE — DISCHARGE SUMMARY
[facility]  DISCHARGE SUMMARY       Date of Admission: 7/2/2017  Date of Discharge:  7/6/2017  Primary Care Physician: Fadi Daniels MD    Discharge Diagnoses:  Patient Active Problem List   Diagnosis   • Acute respiratory failure         Presenting Problem/History of Present Illness:  Acute respiratory failure [J96.00]     Chief Complaint on Day of Discharge:   Decreased shortness of breath    History of Present Illness on Day of Discharge:   The patient did not use BiPAP last night and is seemingly doing well with 2 L per nasal cannula.  She was more active yesterday and is asking to be discharged.  Pulmonology has seen the patient and agrees with discharge.    Hospital Course  This 66-year-old white female was admitted to the CCU for the treatment of acute respiratory failure.  She initially presented to [facility] and developed apnea requiring emergent intubation and mechanical ventilation.  She was transferred to our facility for a higher level of care.  She presented with a recent diagnosis of atrial fibrillation and had recently begun to take sotalol and Eliquis.  She was started on Levaquin 500 mg IV daily as well as Solu-Medrol 80 mg IV every 8 hours and prophylactic Protonix.  Pulmonology was consulted.  The patient was extubated and placed on BiPAP shortly after arrival and requested to never be intubated again.  The patient admitted noncompliance with home BiPAP.  She was transferred to the medical surgical floor the following day.  The patient quickly improved and began to ambulate without difficulty and without dyspnea.  She was tapered to 2 L per nasal cannula, steroids were converted to oral and is felt stable for discharge today.    Procedures Performed:   None    Consults:   Pulmonology    Pertinent Test Results:   Lab Results (last 7 days)     Procedure Component Value Units Date/Time    Blood Gas, Arterial [[id number]]   (Abnormal) Collected:  07/02/17 0311    Specimen:  Arterial Blood Updated:  07/02/17 0316     Site Arterial: right radial     Shahram's Test --      Documented in Rapid Comm        pH, Arterial 7.179 (C) pH units      pCO2, Arterial 73.5 (C) mm Hg      pO2, Arterial 571.9 (H) mm Hg      HCO3, Arterial 26.8 (H) mmol/L      Base Excess, Arterial -3.5 (L) mmol/L      O2 Saturation, Arterial 99.9 %      O2 Saturation Calculated 99.9 %      Barometric Pressure for Blood Gas -- mmHg       Component not reported at this site.        Modality Ventilator     FIO2 100 %      Ventilator Mode AC     Rate 14.0 Breaths/minute      PEEP 5.0     Notified Who Mallory Haase [id number] RN     Notified By Yoko Mello RRT     Notified Time 7/2/2017 3:16:17 AM     Vent CPAP/PEEP 5.0    Narrative:       Serial Number: [id number]    : [id number]    Comprehensive Metabolic Panel [[id number]]  (Abnormal) Collected:  07/02/17 0346    Specimen:  Blood Updated:  07/02/17 0412     Glucose 125 (H) mg/dL      BUN 12 mg/dL      Creatinine 0.63 mg/dL      Sodium 141 mmol/L      Potassium 4.9 mmol/L      Chloride 103 mmol/L      CO2 28.0 mmol/L      Calcium 9.2 mg/dL      Total Protein 6.5 g/dL      Albumin 3.90 g/dL      ALT (SGPT) 82 (H) U/L      AST (SGOT) 98 (H) U/L      Alkaline Phosphatase 81 U/L      Total Bilirubin 0.6 mg/dL      eGFR Non African Amer 95 mL/min/1.73      Globulin 2.6 gm/dL      A/G Ratio 1.5 g/dL      BUN/Creatinine Ratio 19.0     Anion Gap 10.0 mmol/L     Blood Gas, Arterial [[id number]]  (Abnormal) Collected:  07/02/17 0409    Specimen:  Arterial Blood Updated:  07/02/17 0414     Site Arterial: right radial     Shahram's Test --      Documented in Rapid Comm        pH, Arterial 7.242 (C) pH units      pCO2, Arterial 54.6 (H) mm Hg      pO2, Arterial 84.4 mm Hg      HCO3, Arterial 23.0 mmol/L      Base Excess, Arterial -5.1 (L) mmol/L      O2 Saturation, Arterial 94.5 %      O2 Saturation Calculated 94.5 %      Barometric Pressure  for Blood Gas -- mmHg       Component not reported at this site.        Modality Ventilator     FIO2 30 %      Ventilator Mode AC     Rate 18.0 Breaths/minute      PEEP 5.0     Notified Who MALLORY HAASE RN [id number]     Notified By DRISS AGUIRRE RRT [id number]     Notified Time 7/2/2017 4:13:47 AM     Vent CPAP/PEEP 5.0    Narrative:       Serial Number: [id number]    : [id number]    Protime-INR [[id number]]  (Normal) Collected:  07/02/17 0346    Specimen:  Blood Updated:  07/02/17 0424     Protime 14.2 Seconds      INR 1.07    aPTT [[id number]]  (Normal) Collected:  07/02/17 0346    Specimen:  Blood Updated:  07/02/17 0424     PTT 25.2 seconds     Troponin [[id number]]  (Abnormal) Collected:  07/02/17 0346    Specimen:  Blood Updated:  07/02/17 0424     Troponin I 0.039 (H) ng/mL     BNP [[id number]]  (Normal) Collected:  07/02/17 0346    Specimen:  Blood Updated:  07/02/17 0424     proBNP 256.0 pg/mL     CBC Auto Differential [[id number]]  (Abnormal) Collected:  07/02/17 0346    Specimen:  Blood Updated:  07/02/17 0552     WBC 11.11 (H) 10*3/mm3      RBC 5.06 10*6/mm3      Hemoglobin 13.3 g/dL      Hematocrit 45.4 %      MCV 89.7 fL      MCH 26.3 (L) pg      MCHC 29.3 (L) g/dL      RDW 13.2 %      RDW-SD 43.4 fl      MPV 10.2 fL      Platelets 196 10*3/mm3      Neutrophil % 95.0 (H) %      Lymphocyte % 3.1 (L) %      Monocyte % 1.1 (L) %      Eosinophil % 0.5 %      Basophil % 0.1 %      Immature Grans % 0.2 %      Neutrophils, Absolute 10.57 (H) 10*3/mm3      Lymphocytes, Absolute 0.34 (L) 10*3/mm3      Monocytes, Absolute 0.12 (L) 10*3/mm3      Eosinophils, Absolute 0.05 10*3/mm3      Basophils, Absolute 0.01 10*3/mm3      Immature Grans, Absolute 0.02 10*3/mm3      nRBC 0.0 /100 WBC     Urinalysis With / Microscopic If Indicated [[id number]]  (Abnormal) Collected:  07/02/17 0530    Specimen:  Urine from Urine, Catheter Updated:  07/02/17 0703     Color, UA Yellow     Appearance, UA Clear     pH, UA 6.5     Specific Gravity,  UA 1.013     Glucose, UA Negative     Ketones, UA Trace (A)     Bilirubin, UA Negative     Blood, UA Trace (A)     Protein, UA 30 mg/dL (1+) (A)     Leuk Esterase, UA Negative     Nitrite, UA Negative     Urobilinogen, UA 1.0 E.U./dL    Urinalysis, Microscopic Only [[id number]]  (Abnormal) Collected:  07/02/17 0530    Specimen:  Urine from Urine, Catheter Updated:  07/02/17 0703     RBC, UA 0-2 (A) /HPF      WBC, UA 0-2 (A) /HPF      Bacteria, UA None Seen /HPF      Squamous Epithelial Cells, UA 0-2 /HPF      Hyaline Casts, UA None Seen /LPF      Methodology Manual Light Microscopy    Blood Gas, Arterial [[id number]]  (Abnormal) Collected:  07/02/17 0730    Specimen:  Arterial Blood Updated:  07/02/17 0733     Site Arterial: left radial     Shahram's Test --      Documented in Rapid Comm        pH, Arterial 7.367 pH units      pCO2, Arterial 46.1 (H) mm Hg      pO2, Arterial 88.8 mm Hg      HCO3, Arterial 25.9 mmol/L      Base Excess, Arterial 0.1 mmol/L      O2 Saturation, Arterial 96.5 %      O2 Saturation Calculated 96.5 %      Barometric Pressure for Blood Gas -- mmHg       Component not reported at this site.        Modality Ventilator     FIO2 30 %      Ventilator Mode AC     Rate 20.0 Breaths/minute      PEEP 5.0     Vent CPAP/PEEP 5.0    Narrative:       Serial Number: [id number]    : [id number]    Troponin [[id number]]  (Abnormal) Collected:  07/02/17 0938    Specimen:  Blood Updated:  07/02/17 1010     Troponin I 0.144 (H) ng/mL     CBC (No Diff) [[id number]]  (Abnormal) Collected:  07/03/17 0201    Specimen:  Blood Updated:  07/03/17 0236     WBC 3.41 (L) 10*3/mm3      RBC 4.22 10*6/mm3      Hemoglobin 11.7 (L) g/dL      Hematocrit 37.4 %      MCV 88.6 fL      MCH 27.7 (L) pg      MCHC 31.3 (L) g/dL      RDW 13.1 %      RDW-SD 42.4 fl      MPV 10.1 fL      Platelets 206 10*3/mm3     Basic Metabolic Panel [[id number]]  (Abnormal) Collected:  07/03/17 0201    Specimen:  Blood Updated:  07/03/17 0241     Glucose 128  (H) mg/dL      BUN 15 mg/dL      Creatinine 0.55 mg/dL      Sodium 139 mmol/L      Potassium 4.8 mmol/L      Chloride 102 mmol/L      CO2 32.0 (H) mmol/L      Calcium 9.5 mg/dL      eGFR Non African Amer 111 mL/min/1.73      BUN/Creatinine Ratio 27.3 (H)     Anion Gap 5.0 mmol/L     Narrative:       GFR Normal >60  Chronic Kidney Disease <60  Kidney Failure <15    Blood Gas, Arterial [[id number]]  (Abnormal) Collected:  07/03/17 0640    Specimen:  Arterial Blood Updated:  07/03/17 0644     Site Arterial: right radial     Shahram's Test --      Documented in Rapid Comm        pH, Arterial 7.299 (L) pH units      pCO2, Arterial 68.3 (H) mm Hg      pO2, Arterial 89.4 mm Hg      HCO3, Arterial 32.8 (H) mmol/L      Base Excess, Arterial 4.0 (H) mmol/L      O2 Saturation, Arterial 95.7 %      O2 Saturation Calculated 95.7 %      Barometric Pressure for Blood Gas -- mmHg       Component not reported at this site.        Modality Cannula     Flow Rate 2.00 lpm     Narrative:       Serial Number: [id number]    : [id number]    Blood Gas, Arterial With Co-Ox [[id number]]  (Abnormal) Collected:  07/03/17 0928    Specimen:  Arterial Blood Updated:  07/03/17 0931     Site Arterial: left radial     Shahram's Test --      Documented in Rapid Comm        pH, Arterial 7.394 pH units      pCO2, Arterial 53.4 (H) mm Hg      pO2, Arterial 65.0 (L) mm Hg      HCO3, Arterial 31.9 (H) mmol/L      Base Excess, Arterial 5.8 (H) mmol/L      Hemoglobin, Blood Gas 12.1 g/dL      Hematocrit, Blood Gas 36.0 (L) %      Oxyhemoglobin 91.9 (L) %      Methemoglobin 0.3 (L) %      Carboxyhemoglobin 0.9 %      Sodium, Arterial 135.8 mmol/L      Potassium, Arterial 4.32 mmol/L      Barometric Pressure for Blood Gas -- mmHg       Component not reported at this site.        Modality BiPAP     FIO2 30 %      IPAP 12     EPAP 6    Narrative:       Serial Number: [id number]    : [id number]    CBC (No Diff) [[id number]]  (Abnormal) Collected:  07/04/17 0242     "Specimen:  Blood Updated:  07/04/17 0319     WBC 4.76 (L) 10*3/mm3      RBC 3.79 (L) 10*6/mm3      Hemoglobin 10.6 (L) g/dL      Hematocrit 33.6 (L) %      MCV 88.7 fL      MCH 28.0 pg      MCHC 31.5 (L) g/dL      RDW 13.2 %      RDW-SD 42.5 fl      MPV 9.9 fL      Platelets 178 10*3/mm3     Basic Metabolic Panel [[id number]]  (Abnormal) Collected:  07/04/17 0242    Specimen:  Blood Updated:  07/04/17 0338     Glucose 150 (H) mg/dL      BUN 19 mg/dL      Creatinine 0.48 (L) mg/dL      Sodium 138 mmol/L      Potassium 4.5 mmol/L      Chloride 100 mmol/L      CO2 34.0 (H) mmol/L      Calcium 9.1 mg/dL      eGFR Non African Amer 130 mL/min/1.73      BUN/Creatinine Ratio 39.6 (H)     Anion Gap 4.0 mmol/L     Narrative:       GFR Normal >60  Chronic Kidney Disease <60  Kidney Failure <15        Imaging Results (last 7 days)     Procedure Component Value Units Date/Time    XR Chest 1 View [[id number]] Collected:  07/02/17 0840     Updated:  07/02/17 0844    Narrative:       HISTORY: Endotracheal tube placement     FINDINGS: Portable supine radiograph of the chest reveals the patient  has been intubated with the endotracheal tube well positioned. There are  emphysematous changes of the lungs with hyperinflation of the lung  parenchyma. There is no evidence of acute lobar pneumonia or effusion.  There is mild gastric distention.       Impression:       1.. Patient intubated with endotracheal tube well-positioned.  2. Emphysematous changes of the lungs.  This report was finalized on 07/02/2017 08:40 by Dr. True Verdin MD.            Condition on Discharge:    Stable and back to baseline    Physical Exam on Discharge:  /48 (BP Location: Right arm, Patient Position: Lying)  Pulse (!) 46  Temp 97.1 °F (36.2 °C) (Temporal Artery )   Resp 18  Ht 62\" (157.5 cm)  Wt 81 lb 4.8 oz (36.9 kg)  LMP  (LMP Unknown)  SpO2 99%  BMI 14.87 kg/m2  Physical Exam  Constitutional: She appears well-developed. Non-toxic appearance. " She appears chronically ill. No distress. Nasal cannula in place.   HENT:   Head: Normocephalic and atraumatic.   Nose: Nose normal.   Eyes: EOM are normal. No scleral icterus.   Neck: No JVD present. No tracheal deviation present.   Cardiovascular: Normal rate and regular rhythm.   Pulmonary/Chest: Effort normal. No respiratory distress. She has no rales or rhonchi.   Abdominal: Soft. There is no tenderness. There is no guarding.   Musculoskeletal: She exhibits no edema.   Neurological: She is alert but somewhat lethargic.   Skin: Skin is warm and dry. She is not diaphoretic.   Psychiatric: She has a normal mood and affect. Her behavior is normal.     Discharge Disposition:  Home or Self Care    Discharge Medications:   Melanie Stovall   Home Medication Instructions BRUNA:[id number]    Printed on:07/06/17 4588   Medication Information                      acetaminophen (TYLENOL) 325 MG tablet  Take 325 mg by mouth Every 4 (Four) Hours As Needed for Mild Pain (1-3) or Fever.             apixaban (ELIQUIS) 5 MG tablet tablet  Take 5 mg by mouth Every 12 (Twelve) Hours.             atorvastatin (LIPITOR) 10 MG tablet  Take 10 mg by mouth Daily.             budesonide-formoterol (SYMBICORT) 160-4.5 MCG/ACT inhaler  Inhale 2 puffs 2 (Two) Times a Day.             diazePAM (VALIUM) 10 MG tablet  Take 10 mg by mouth 4 (Four) Times a Day Before Meals & at Bedtime As Needed for Anxiety.             doxycycline (VIBRAMYICN) 100 MG tablet  Take 1 tablet by mouth Every 12 (Twelve) Hours. Indications: Upper Respiratory Tract Infection             HYDROcodone-acetaminophen (NORCO)  MG per tablet  Take 1 tablet by mouth 4 (Four) Times a Day.             ipratropium-albuterol (DUO-NEB) 0.5-2.5 mg/mL nebulizer  Take 3 mL by nebulization Every 3 (Three) Hours.             levothyroxine (SYNTHROID, LEVOTHROID) 150 MCG tablet  Take 150 mcg by mouth Daily.             O2 (OXYGEN)  Inhale 2 L/min Continuous.             ondansetron  (ZOFRAN) 4 MG tablet  Take 4 mg by mouth Every 8 (Eight) Hours As Needed for Nausea or Vomiting.             predniSONE (DELTASONE) 20 MG tablet  Take 1 tablet by mouth Daily With Breakfast.             promethazine (PHENERGAN) 25 MG tablet  Take 25 mg by mouth Every 6 (Six) Hours As Needed for Nausea or Vomiting.             rizatriptan (MAXALT) 10 MG tablet  Take 10 mg by mouth Daily As Needed for Migraine. May repeat in 2 hours if needed             sotalol (BETAPACE) 80 MG tablet  Take 80 mg by mouth Every 12 (Twelve) Hours.                 Discharge Diet:   Diet Instructions     Diet: Regular; Thin Liquids, No Restrictions       Discharge Diet:  Regular   Fluid Consistency:  Thin Liquids, No Restrictions                 Discharge Care Plan / Instructions:   Discharged to home    Activity at Discharge:   Activity Instructions     Activity as Tolerated                     Follow-up Appointments:  Primary care physician in one week    Test Results Pending at Discharge:   None     Abel Weber DO  07/06/17  10:42 AM    Time: Greater than 30 minutes    Please note that portions of this note may have been completed with a voice recognition program. Efforts were made to edit the dictations, but occasionally words are mistranscribed.

## 2017-07-06 NOTE — PROGRESS NOTES
Continued Stay Note  [facility]     Patient Name: Melanie Stovall  MRN: [id number]  Today's Date: 7/6/2017    Admit Date: 7/2/2017          Discharge Plan       07/06/17 1113    Case Management/Social Work Plan    Plan Home    Patient/Family In Agreement With Plan yes    Final Note    Final Note Pt is going home today. Noted that she already has a bipap at home but she is noncompliant with it, per MD documentation. She also already has O2 at home. Pt was worried about bills so gave her a community resource packet. Also gave her a free 30 day coupon for Symbicort along with a phone number to see if she qualifies for further assistance.               Discharge Codes       07/06/17 1116    Discharge Codes    Discharge Codes 01  Discharge to home        Expected Discharge Date and Time     Expected Discharge Date Expected Discharge Time    Jul 6, 2017             BRAULIO Darling

## 2017-07-06 NOTE — PROGRESS NOTES
[facility] Pulmonary Progress Note    Patient: Melanie Stovall  1950   MR# [id number]   Acct# [id number]  07/06/17   8:49 AM  Referring Provider: Abel Weber DO      Problem list:   Patient Active Problem List   Diagnosis   • Acute respiratory failure      Chief Complaint: COPD exacerbation, hypercapnia-improved    Interval history: Patient is resting comfortably on 2L NC O2 with a saturation of 99%. She is anxious to go home today if possible. She does not want a bipap for home and reports that she also already has oxygen at home. She will need to wait for her daughter to get done with her doctor's appt this morning to be able to come and pick her up but otherwise she is prepared to go home. No other aggravating, alleviating factors or associated symptoms.    HPI    Allergy:   Allergies   Allergen Reactions   • Morphine And Related Anaphylaxis and Nausea And Vomiting   • Ativan [Lorazepam] Mental Status Change       Meds:      apixaban 5 mg Oral BID   budesonide-formoterol 2 puff Inhalation BID - RT   diazePAM 5 mg Oral Q8H   doxycycline 100 mg Oral Q12H   famotidine 20 mg Oral BID   guaiFENesin 1,200 mg Oral Q12H   ipratropium-albuterol 3 mL Nebulization Q4H - RT   levothyroxine 150 mcg Oral Q AM   nicotine 1 patch Transdermal Q24H   predniSONE 20 mg Oral Daily With Breakfast   sotalol 80 mg Oral BID        Review of Systems  Constitutional: Negative for chills and fever.   HENT: Negative for congestion and sinus pressure.   Respiratory: Positive for shortness of breath (improving) and wheezing. Negative for cough and stridor.   Cardiovascular: Negative for chest pain.   Gastrointestinal: Negative for vomiting.   Neurological: Negative for headaches.   Psychiatric/Behavioral: Negative for agitation.   All other systems reviewed and are negative.    Physical Exam:  Vitals:    07/06/17 0751   BP: 133/48   Pulse: (!) 46   Resp: 18   Temp: 97.1 °F (36.2 °C)   SpO2: 99%       Intake/Output Summary (Last 24 hours)  at 07/06/17 0849  Last data filed at 07/05/17 1700   Gross per 24 hour   Intake                0 ml   Output             1200 ml   Net            -1200 ml     Physical Exam  Constitutional: She appears well-developed. Non-toxic appearance. She appears chronically ill. No distress. Nasal cannula in place.   HENT:   Head: Normocephalic and atraumatic.   Nose: Nose normal.   Eyes: EOM are normal. No scleral icterus.   Neck: No JVD present. No tracheal deviation present.    Cardiovascular: Normal rate and regular rhythm.   Pulmonary/Chest: Effort normal. No respiratory distress. She has no rales or rhonchi.   Abdominal: Soft. There is no tenderness. There is no guarding.   Musculoskeletal: She exhibits no edema.   Neurological: She is alert but somewhat lethargic.   Skin: Skin is warm and dry. She is not diaphoretic.   Psychiatric: She has a normal mood and affect. Her behavior is normal.     Data:     Results from last 7 days  Lab Units 07/04/17  0242 07/03/17  0201 07/02/17  0346   WBC 10*3/mm3 4.76* 3.41* 11.11*   HEMOGLOBIN g/dL 10.6* 11.7* 13.3   PLATELETS 10*3/mm3 178 206 196         Results from last 7 days  Lab Units 07/04/17  0242 07/03/17  0928 07/03/17  0201 07/02/17  0346   SODIUM mmol/L 138  --  139 141   SODIUM, ARTERIAL mmol/L  --  135.8  --   --    POTASSIUM mmol/L 4.5  --  4.8 4.9   BUN mg/dL 19  --  15 12   CREATININE mg/dL 0.48*  --  0.55 0.63         Results from last 7 days  Lab Units 07/03/17  0928 07/03/17  0640 07/02/17  0730 07/02/17  0409   PH, ARTERIAL pH units 7.394 7.299* 7.367 7.242*   PCO2, ARTERIAL mm Hg 53.4* 68.3* 46.1* 54.6*   PO2 ART mm Hg 65.0* 89.4 88.8 84.4   FIO2 % 30  --  30 30     Radiology:  Imaging Results (last 24 hours)     ** No results found for the last 24 hours. **        Pulmonary Assessment:  1. COPD, acute exacerbation-better  2. Tobacco abuse  3. Respiratory acidosis-better  4.  Acute on chronic hypercapnic respiratory failure-stable    Plan:   · Ok to home from a  pulmonary standpoint  · Change to prednisone. Will need to taper off over the next week  · Already has oxygen at home.   · Patient is a DNI  · Not interested in NIPPV therapy for home.   · F/U in the office in 3 months or sooner if needed   · Recommend total smoking cessation to continue after discharge    Electronically signed by LICO Fry, 07/06/17, 8:49 AM     Physician substantive contribution:  Pertinent symptoms/interval history include: dyspnea improved.  Wants to go home  Respiratory exam shows pertinent findings of:diminished breath sounds, improved.  Plan includes: ok to home with follow up.  I have seen and examined patient personally, performing a face-to-face diagnostic evaluation with plan of care reviewed and developed with APRN and nursing staff. I have addended and/or modified the above history of present illness, physical examination, and assessment and plan to reflect my findings and impressions. Essential elements of the care plan were discussed with APRN above.  Agree with findings and assessment/plan as documented above.    Electronically signed by Pj Mello MD, on 7/6/2017, 9:11 AM

## 2017-07-06 NOTE — PLAN OF CARE
Problem: Patient Care Overview (Adult)  Goal: Plan of Care Review  Outcome: Ongoing (interventions implemented as appropriate)  Cont iv steroids. o2 2l nc. Up in room with assist, using bedside commode. marilou little po intake. No anxiety this shift. No acute distress noted.    07/06/17 0217   Coping/Psychosocial Response Interventions   Plan Of Care Reviewed With patient   Patient Care Overview   Progress improving       Goal: Adult Individualization and Mutuality  Outcome: Ongoing (interventions implemented as appropriate)  Goal: Discharge Needs Assessment  Outcome: Ongoing (interventions implemented as appropriate)    Problem: Fall Risk (Adult)  Goal: Absence of Falls  Outcome: Ongoing (interventions implemented as appropriate)    Problem: COPD, Chronic Bronchitis/Emphysema (Adult)  Goal: Signs and Symptoms of Listed Potential Problems Will be Absent or Manageable (COPD, Chronic Bronchitis/Emphysema)  Outcome: Ongoing (interventions implemented as appropriate)    Problem: Anxiety (Adult)  Goal: Reduction/Resolution  Outcome: Ongoing (interventions implemented as appropriate)

## 2017-07-07 NOTE — PAYOR COMM NOTE
"MA HOME 7-6-17    [id number]    Arnaldo Boone (66 y.o. Female)     Date of Birth Social Security Number Address Home Phone MRN    1950  [address] [place] [address] [phone] [id number]    Buddhist Marital Status          Unknown        Admission Date Admission Type Admitting Provider Attending Provider Department, Room/Bed    7/2/17 Urgent Abel Weber DO  [facility] 4C, 476/1    Discharge Date Discharge Disposition Discharge Destination        7/6/2017 Home or Self Care Home            Attending Provider: (none)    Allergies:  Morphine And Related, Ativan [Lorazepam]    Isolation:  None   Infection:  None   Code Status:  Prior    Ht:  62\" (157.5 cm)   Wt:  81 lb 4.8 oz (36.9 kg)    Admission Cmt:  None   Principal Problem:  None                Active Insurance as of 7/2/2017     Primary Coverage     Payor Plan Insurance Group Employer/Plan Group    MEDICARE MEDICARE A & B      Payor Plan Address Payor Plan Phone Number Effective From Effective To    [address] [phone] 10/1/2004     [place] [address]       Subscriber Name Subscriber Birth Date Member ID       ARNALDO BOONE 1950 [id number]           Secondary Coverage     Payor Plan Insurance Group Employer/Plan Group    [employer] [place]ICA[place]      Payor Plan Address Payor Plan Phone Number Effective From Effective To    [address] [address] [phone] 7/2/2017     [place] [address]       Subscriber Name Subscriber Birth Date Member ID       ARNALDO BOONE 1950 [id number]                 Emergency Contacts      (Rel.) Home Phone Work Phone Mobile Phone    Sis Boone (Daughter) -- -- [phone]    Santino Boone (Spouse) -- -- [phone]               Discharge Summary      Abel Weber DO at 7/6/2017 10:42 AM              [facility] Medicine Services  DISCHARGE SUMMARY       Date of Admission: 7/2/2017  Date of Discharge:  " 7/6/2017  Primary Care Physician: Fadi Daniels MD    Discharge Diagnoses:  Patient Active Problem List   Diagnosis   • Acute respiratory failure         Presenting Problem/History of Present Illness:  Acute respiratory failure [J96.00]     Chief Complaint on Day of Discharge:   Decreased shortness of breath    History of Present Illness on Day of Discharge:   The patient did not use BiPAP last night and is seemingly doing well with 2 L per nasal cannula.  She was more active yesterday and is asking to be discharged.  Pulmonology has seen the patient and agrees with discharge.    Hospital Course  This 66-year-old white female was admitted to the CCU for the treatment of acute respiratory failure.  She initially presented to [facility] and developed apnea requiring emergent intubation and mechanical ventilation.  She was transferred to our facility for a higher level of care.  She presented with a recent diagnosis of atrial fibrillation and had recently begun to take sotalol and Eliquis.  She was started on Levaquin 500 mg IV daily as well as Solu-Medrol 80 mg IV every 8 hours and prophylactic Protonix.  Pulmonology was consulted.  The patient was extubated and placed on BiPAP shortly after arrival and requested to never be intubated again.  The patient admitted noncompliance with home BiPAP.  She was transferred to the medical surgical floor the following day.  The patient quickly improved and began to ambulate without difficulty and without dyspnea.  She was tapered to 2 L per nasal cannula, steroids were converted to oral and is felt stable for discharge today.    Procedures Performed:   None    Consults:   Pulmonology    Pertinent Test Results:   Lab Results (last 7 days)     Procedure Component Value Units Date/Time    Blood Gas, Arterial [[id number]]  (Abnormal) Collected:  07/02/17 0311    Specimen:  Arterial Blood Updated:  07/02/17 0316     Site Arterial: right radial     Shahram's Test --       Documented in Rapid Comm        pH, Arterial 7.179 (C) pH units      pCO2, Arterial 73.5 (C) mm Hg      pO2, Arterial 571.9 (H) mm Hg      HCO3, Arterial 26.8 (H) mmol/L      Base Excess, Arterial -3.5 (L) mmol/L      O2 Saturation, Arterial 99.9 %      O2 Saturation Calculated 99.9 %      Barometric Pressure for Blood Gas -- mmHg       Component not reported at this site.        Modality Ventilator     FIO2 100 %      Ventilator Mode AC     Rate 14.0 Breaths/minute      PEEP 5.0     Notified [facility] Mallory Haase [id number] RN     Notified By Yoko Mello, JAY     Notified Time 7/2/2017 3:16:17 AM     Vent CPAP/PEEP 5.0    Narrative:       Serial Number: [id number]    : [id number]    Comprehensive Metabolic Panel [[id number]]  (Abnormal) Collected:  07/02/17 0346    Specimen:  Blood Updated:  07/02/17 0412     Glucose 125 (H) mg/dL      BUN 12 mg/dL      Creatinine 0.63 mg/dL      Sodium 141 mmol/L      Potassium 4.9 mmol/L      Chloride 103 mmol/L      CO2 28.0 mmol/L      Calcium 9.2 mg/dL      Total Protein 6.5 g/dL      Albumin 3.90 g/dL      ALT (SGPT) 82 (H) U/L      AST (SGOT) 98 (H) U/L      Alkaline Phosphatase 81 U/L      Total Bilirubin 0.6 mg/dL      eGFR Non African Amer 95 mL/min/1.73      Globulin 2.6 gm/dL      A/G Ratio 1.5 g/dL      BUN/Creatinine Ratio 19.0     Anion Gap 10.0 mmol/L     Blood Gas, Arterial [[id number]]  (Abnormal) Collected:  07/02/17 0409    Specimen:  Arterial Blood Updated:  07/02/17 0414     Site Arterial: right radial     Shahram's Test --      Documented in Rapid Comm        pH, Arterial 7.242 (C) pH units      pCO2, Arterial 54.6 (H) mm Hg      pO2, Arterial 84.4 mm Hg      HCO3, Arterial 23.0 mmol/L      Base Excess, Arterial -5.1 (L) mmol/L      O2 Saturation, Arterial 94.5 %      O2 Saturation Calculated 94.5 %      Barometric Pressure for Blood Gas -- mmHg       Component not reported at this site.        Modality Ventilator     FIO2 30 %      Ventilator Mode AC     Rate 18.0  Breaths/minute      PEEP 5.0     Notified [facility] MALLORY HAASE RN [id number]     Notified By DRISS AGUIRRE RRT [id number]     Notified Time 7/2/2017 4:13:47 AM     Vent CPAP/PEEP 5.0    Narrative:       Serial Number: [id number]    : [id number]    Protime-INR [[id number]]  (Normal) Collected:  07/02/17 0346    Specimen:  Blood Updated:  07/02/17 0424     Protime 14.2 Seconds      INR 1.07    aPTT [[id number]]  (Normal) Collected:  07/02/17 0346    Specimen:  Blood Updated:  07/02/17 0424     PTT 25.2 seconds     Troponin [[id number]]  (Abnormal) Collected:  07/02/17 0346    Specimen:  Blood Updated:  07/02/17 0424     Troponin I 0.039 (H) ng/mL     BNP [[id number]]  (Normal) Collected:  07/02/17 0346    Specimen:  Blood Updated:  07/02/17 0424     proBNP 256.0 pg/mL     CBC Auto Differential [[id number]]  (Abnormal) Collected:  07/02/17 0346    Specimen:  Blood Updated:  07/02/17 0552     WBC 11.11 (H) 10*3/mm3      RBC 5.06 10*6/mm3      Hemoglobin 13.3 g/dL      Hematocrit 45.4 %      MCV 89.7 fL      MCH 26.3 (L) pg      MCHC 29.3 (L) g/dL      RDW 13.2 %      RDW-SD 43.4 fl      MPV 10.2 fL      Platelets 196 10*3/mm3      Neutrophil % 95.0 (H) %      Lymphocyte % 3.1 (L) %      Monocyte % 1.1 (L) %      Eosinophil % 0.5 %      Basophil % 0.1 %      Immature Grans % 0.2 %      Neutrophils, Absolute 10.57 (H) 10*3/mm3      Lymphocytes, Absolute 0.34 (L) 10*3/mm3      Monocytes, Absolute 0.12 (L) 10*3/mm3      Eosinophils, Absolute 0.05 10*3/mm3      Basophils, Absolute 0.01 10*3/mm3      Immature Grans, Absolute 0.02 10*3/mm3      nRBC 0.0 /100 WBC     Urinalysis With / Microscopic If Indicated [[id number]]  (Abnormal) Collected:  07/02/17 0530    Specimen:  Urine from Urine, Catheter Updated:  07/02/17 0703     Color, UA Yellow     Appearance, UA Clear     pH, UA 6.5     Specific Gravity, UA 1.013     Glucose, UA Negative     Ketones, UA Trace (A)     Bilirubin, UA Negative     Blood, UA Trace (A)     Protein, UA 30 mg/dL (1+)  (A)     Leuk Esterase, UA Negative     Nitrite, UA Negative     Urobilinogen, UA 1.0 E.U./dL    Urinalysis, Microscopic Only [[id number]]  (Abnormal) Collected:  07/02/17 0530    Specimen:  Urine from Urine, Catheter Updated:  07/02/17 0703     RBC, UA 0-2 (A) /HPF      WBC, UA 0-2 (A) /HPF      Bacteria, UA None Seen /HPF      Squamous Epithelial Cells, UA 0-2 /HPF      Hyaline Casts, UA None Seen /LPF      Methodology Manual Light Microscopy    Blood Gas, Arterial [[id number]]  (Abnormal) Collected:  07/02/17 0730    Specimen:  Arterial Blood Updated:  07/02/17 0733     Site Arterial: left radial     Shahram's Test --      Documented in Rapid Comm        pH, Arterial 7.367 pH units      pCO2, Arterial 46.1 (H) mm Hg      pO2, Arterial 88.8 mm Hg      HCO3, Arterial 25.9 mmol/L      Base Excess, Arterial 0.1 mmol/L      O2 Saturation, Arterial 96.5 %      O2 Saturation Calculated 96.5 %      Barometric Pressure for Blood Gas -- mmHg       Component not reported at this site.        Modality Ventilator     FIO2 30 %      Ventilator Mode AC     Rate 20.0 Breaths/minute      PEEP 5.0     Vent CPAP/PEEP 5.0    Narrative:       Serial Number: [id number]    : [id number]    Troponin [[id number]]  (Abnormal) Collected:  07/02/17 0938    Specimen:  Blood Updated:  07/02/17 1010     Troponin I 0.144 (H) ng/mL     CBC (No Diff) [[id number]]  (Abnormal) Collected:  07/03/17 0201    Specimen:  Blood Updated:  07/03/17 0236     WBC 3.41 (L) 10*3/mm3      RBC 4.22 10*6/mm3      Hemoglobin 11.7 (L) g/dL      Hematocrit 37.4 %      MCV 88.6 fL      MCH 27.7 (L) pg      MCHC 31.3 (L) g/dL      RDW 13.1 %      RDW-SD 42.4 fl      MPV 10.1 fL      Platelets 206 10*3/mm3     Basic Metabolic Panel [[id number]]  (Abnormal) Collected:  07/03/17 0201    Specimen:  Blood Updated:  07/03/17 0241     Glucose 128 (H) mg/dL      BUN 15 mg/dL      Creatinine 0.55 mg/dL      Sodium 139 mmol/L      Potassium 4.8 mmol/L      Chloride 102 mmol/L      CO2  32.0 (H) mmol/L      Calcium 9.5 mg/dL      eGFR Non African Amer 111 mL/min/1.73      BUN/Creatinine Ratio 27.3 (H)     Anion Gap 5.0 mmol/L     Narrative:       GFR Normal >60  Chronic Kidney Disease <60  Kidney Failure <15    Blood Gas, Arterial [[id number]]  (Abnormal) Collected:  07/03/17 0640    Specimen:  Arterial Blood Updated:  07/03/17 0644     Site Arterial: right radial     Shahram's Test --      Documented in Rapid Comm        pH, Arterial 7.299 (L) pH units      pCO2, Arterial 68.3 (H) mm Hg      pO2, Arterial 89.4 mm Hg      HCO3, Arterial 32.8 (H) mmol/L      Base Excess, Arterial 4.0 (H) mmol/L      O2 Saturation, Arterial 95.7 %      O2 Saturation Calculated 95.7 %      Barometric Pressure for Blood Gas -- mmHg       Component not reported at this site.        Modality Cannula     Flow Rate 2.00 lpm     Narrative:       Serial Number: [id number]    : [id number]    Blood Gas, Arterial With Co-Ox [[id number]]  (Abnormal) Collected:  07/03/17 0928    Specimen:  Arterial Blood Updated:  07/03/17 0931     Site Arterial: left radial     Shahram's Test --      Documented in Rapid Comm        pH, Arterial 7.394 pH units      pCO2, Arterial 53.4 (H) mm Hg      pO2, Arterial 65.0 (L) mm Hg      HCO3, Arterial 31.9 (H) mmol/L      Base Excess, Arterial 5.8 (H) mmol/L      Hemoglobin, Blood Gas 12.1 g/dL      Hematocrit, Blood Gas 36.0 (L) %      Oxyhemoglobin 91.9 (L) %      Methemoglobin 0.3 (L) %      Carboxyhemoglobin 0.9 %      Sodium, Arterial 135.8 mmol/L      Potassium, Arterial 4.32 mmol/L      Barometric Pressure for Blood Gas -- mmHg       Component not reported at this site.        Modality BiPAP     FIO2 30 %      IPAP 12     EPAP 6    Narrative:       Serial Number: [id number]    : [id number]    CBC (No Diff) [[id number]]  (Abnormal) Collected:  07/04/17 0242    Specimen:  Blood Updated:  07/04/17 0319     WBC 4.76 (L) 10*3/mm3      RBC 3.79 (L) 10*6/mm3      Hemoglobin 10.6 (L) g/dL      Hematocrit 33.6  "(L) %      MCV 88.7 fL      MCH 28.0 pg      MCHC 31.5 (L) g/dL      RDW 13.2 %      RDW-SD 42.5 fl      MPV 9.9 fL      Platelets 178 10*3/mm3     Basic Metabolic Panel [[id number]]  (Abnormal) Collected:  07/04/17 0242    Specimen:  Blood Updated:  07/04/17 0338     Glucose 150 (H) mg/dL      BUN 19 mg/dL      Creatinine 0.48 (L) mg/dL      Sodium 138 mmol/L      Potassium 4.5 mmol/L      Chloride 100 mmol/L      CO2 34.0 (H) mmol/L      Calcium 9.1 mg/dL      eGFR Non African Amer 130 mL/min/1.73      BUN/Creatinine Ratio 39.6 (H)     Anion Gap 4.0 mmol/L     Narrative:       GFR Normal >60  Chronic Kidney Disease <60  Kidney Failure <15        Imaging Results (last 7 days)     Procedure Component Value Units Date/Time    XR Chest 1 View [[id number]] Collected:  07/02/17 0840     Updated:  07/02/17 0844    Narrative:       HISTORY: Endotracheal tube placement     FINDINGS: Portable supine radiograph of the chest reveals the patient  has been intubated with the endotracheal tube well positioned. There are  emphysematous changes of the lungs with hyperinflation of the lung  parenchyma. There is no evidence of acute lobar pneumonia or effusion.  There is mild gastric distention.       Impression:       1.. Patient intubated with endotracheal tube well-positioned.  2. Emphysematous changes of the lungs.  This report was finalized on 07/02/2017 08:40 by Dr. True Verdin MD.            Condition on Discharge:    Stable and back to baseline    Physical Exam on Discharge:  /48 (BP Location: Right arm, Patient Position: Lying)  Pulse (!) 46  Temp 97.1 °F (36.2 °C) (Temporal Artery )   Resp 18  Ht 62\" (157.5 cm)  Wt 81 lb 4.8 oz (36.9 kg)  LMP  (LMP Unknown)  SpO2 99%  BMI 14.87 kg/m2  Physical Exam  Constitutional: She appears well-developed. Non-toxic appearance. She appears chronically ill. No distress. Nasal cannula in place.   HENT:   Head: Normocephalic and atraumatic.   Nose: Nose normal.   Eyes: EOM " are normal. No scleral icterus.   Neck: No JVD present. No tracheal deviation present.   Cardiovascular: Normal rate and regular rhythm.   Pulmonary/Chest: Effort normal. No respiratory distress. She has no rales or rhonchi.   Abdominal: Soft. There is no tenderness. There is no guarding.   Musculoskeletal: She exhibits no edema.   Neurological: She is alert but somewhat lethargic.   Skin: Skin is warm and dry. She is not diaphoretic.   Psychiatric: She has a normal mood and affect. Her behavior is normal.     Discharge Disposition:  Home or Self Care    Discharge Medications:   Melanie Stovall   Home Medication Instructions BRUNA:[id number]    Printed on:07/06/17 7873   Medication Information                      acetaminophen (TYLENOL) 325 MG tablet  Take 325 mg by mouth Every 4 (Four) Hours As Needed for Mild Pain (1-3) or Fever.             apixaban (ELIQUIS) 5 MG tablet tablet  Take 5 mg by mouth Every 12 (Twelve) Hours.             atorvastatin (LIPITOR) 10 MG tablet  Take 10 mg by mouth Daily.             budesonide-formoterol (SYMBICORT) 160-4.5 MCG/ACT inhaler  Inhale 2 puffs 2 (Two) Times a Day.             diazePAM (VALIUM) 10 MG tablet  Take 10 mg by mouth 4 (Four) Times a Day Before Meals & at Bedtime As Needed for Anxiety.             doxycycline (VIBRAMYICN) 100 MG tablet  Take 1 tablet by mouth Every 12 (Twelve) Hours. Indications: Upper Respiratory Tract Infection             HYDROcodone-acetaminophen (NORCO)  MG per tablet  Take 1 tablet by mouth 4 (Four) Times a Day.             ipratropium-albuterol (DUO-NEB) 0.5-2.5 mg/mL nebulizer  Take 3 mL by nebulization Every 3 (Three) Hours.             levothyroxine (SYNTHROID, LEVOTHROID) 150 MCG tablet  Take 150 mcg by mouth Daily.             O2 (OXYGEN)  Inhale 2 L/min Continuous.             ondansetron (ZOFRAN) 4 MG tablet  Take 4 mg by mouth Every 8 (Eight) Hours As Needed for Nausea or Vomiting.             predniSONE (DELTASONE) 20 MG  tablet  Take 1 tablet by mouth Daily With Breakfast.             promethazine (PHENERGAN) 25 MG tablet  Take 25 mg by mouth Every 6 (Six) Hours As Needed for Nausea or Vomiting.             rizatriptan (MAXALT) 10 MG tablet  Take 10 mg by mouth Daily As Needed for Migraine. May repeat in 2 hours if needed             sotalol (BETAPACE) 80 MG tablet  Take 80 mg by mouth Every 12 (Twelve) Hours.                 Discharge Diet:   Diet Instructions     Diet: Regular; Thin Liquids, No Restrictions       Discharge Diet:  Regular   Fluid Consistency:  Thin Liquids, No Restrictions                 Discharge Care Plan / Instructions:   Discharged to home    Activity at Discharge:   Activity Instructions     Activity as Tolerated                     Follow-up Appointments:  Primary care physician in one week    Test Results Pending at Discharge:   None     Abel Weber DO  07/06/17  10:42 AM    Time: Greater than 30 minutes    Please note that portions of this note may have been completed with a voice recognition program. Efforts were made to edit the dictations, but occasionally words are mistranscribed.             Electronically signed by Abel Weber DO at 7/6/2017 10:52 AM

## 2018-01-01 ENCOUNTER — OUTSIDE FACILITY SERVICE (OUTPATIENT)
Dept: CARDIOLOGY | Facility: CLINIC | Age: 68
End: 2018-01-01

## 2018-01-01 PROCEDURE — 93010 ELECTROCARDIOGRAM REPORT: CPT | Performed by: INTERNAL MEDICINE
